# Patient Record
Sex: MALE | Race: WHITE
[De-identification: names, ages, dates, MRNs, and addresses within clinical notes are randomized per-mention and may not be internally consistent; named-entity substitution may affect disease eponyms.]

---

## 2018-02-12 ENCOUNTER — HOSPITAL ENCOUNTER (INPATIENT)
Dept: HOSPITAL 47 - EC | Age: 83
LOS: 3 days | Discharge: HOME HEALTH | DRG: 4 | End: 2018-02-15
Attending: OTOLARYNGOLOGY | Admitting: OTOLARYNGOLOGY
Payer: MEDICARE

## 2018-02-12 VITALS — BODY MASS INDEX: 24.6 KG/M2

## 2018-02-12 DIAGNOSIS — K57.30: ICD-10-CM
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
## 2018-02-12 DIAGNOSIS — R06.1: ICD-10-CM

## 2018-02-12 DIAGNOSIS — Z79.899: ICD-10-CM

## 2018-02-12 DIAGNOSIS — Z87.891: ICD-10-CM

## 2018-02-12 DIAGNOSIS — J39.2: ICD-10-CM

## 2018-02-12 DIAGNOSIS — J44.9: ICD-10-CM

## 2018-02-12 DIAGNOSIS — I10: ICD-10-CM

## 2018-02-12 DIAGNOSIS — J04.0: ICD-10-CM

## 2018-02-12 DIAGNOSIS — D64.9: ICD-10-CM

## 2018-02-12 DIAGNOSIS — N40.0: ICD-10-CM

## 2018-02-12 DIAGNOSIS — Z86.718: ICD-10-CM

## 2018-02-12 DIAGNOSIS — Z85.21: ICD-10-CM

## 2018-02-12 DIAGNOSIS — M19.91: ICD-10-CM

## 2018-02-12 DIAGNOSIS — Z92.21: ICD-10-CM

## 2018-02-12 DIAGNOSIS — F41.9: ICD-10-CM

## 2018-02-12 DIAGNOSIS — Z96.641: ICD-10-CM

## 2018-02-12 DIAGNOSIS — J98.8: Primary | ICD-10-CM

## 2018-02-12 DIAGNOSIS — R73.9: ICD-10-CM

## 2018-02-12 DIAGNOSIS — K21.9: ICD-10-CM

## 2018-02-12 DIAGNOSIS — J38.4: ICD-10-CM

## 2018-02-12 DIAGNOSIS — Z92.3: ICD-10-CM

## 2018-02-12 DIAGNOSIS — Z86.711: ICD-10-CM

## 2018-02-12 LAB
BASOPHILS # BLD AUTO: 0 K/UL (ref 0–0.2)
BASOPHILS NFR BLD AUTO: 0 %
EOSINOPHIL # BLD AUTO: 0.1 K/UL (ref 0–0.7)
EOSINOPHIL NFR BLD AUTO: 2 %
ERYTHROCYTE [DISTWIDTH] IN BLOOD BY AUTOMATED COUNT: 2.27 M/UL (ref 4.3–5.9)
ERYTHROCYTE [DISTWIDTH] IN BLOOD: 14.4 % (ref 11.5–15.5)
HCT VFR BLD AUTO: 21.4 % (ref 39–53)
HGB BLD-MCNC: 6.9 GM/DL (ref 13–17.5)
LYMPHOCYTES # SPEC AUTO: 1 K/UL (ref 1–4.8)
LYMPHOCYTES NFR SPEC AUTO: 14 %
MCH RBC QN AUTO: 30.3 PG (ref 25–35)
MCHC RBC AUTO-ENTMCNC: 32.2 G/DL (ref 31–37)
MCV RBC AUTO: 94 FL (ref 80–100)
MONOCYTES # BLD AUTO: 0.4 K/UL (ref 0–1)
MONOCYTES NFR BLD AUTO: 5 %
NEUTROPHILS # BLD AUTO: 5.6 K/UL (ref 1.3–7.7)
NEUTROPHILS NFR BLD AUTO: 78 %
PLATELET # BLD AUTO: 254 K/UL (ref 150–450)
WBC # BLD AUTO: 7.1 K/UL (ref 3.8–10.6)

## 2018-02-12 PROCEDURE — 84100 ASSAY OF PHOSPHORUS: CPT

## 2018-02-12 PROCEDURE — 86920 COMPATIBILITY TEST SPIN: CPT

## 2018-02-12 PROCEDURE — 83735 ASSAY OF MAGNESIUM: CPT

## 2018-02-12 PROCEDURE — 88312 SPECIAL STAINS GROUP 1: CPT

## 2018-02-12 PROCEDURE — 86850 RBC ANTIBODY SCREEN: CPT

## 2018-02-12 PROCEDURE — 86900 BLOOD TYPING SEROLOGIC ABO: CPT

## 2018-02-12 PROCEDURE — 0B110F4 BYPASS TRACHEA TO CUTANEOUS WITH TRACHEOSTOMY DEVICE, OPEN APPROACH: ICD-10-PCS

## 2018-02-12 PROCEDURE — 86901 BLOOD TYPING SEROLOGIC RH(D): CPT

## 2018-02-12 PROCEDURE — 80053 COMPREHEN METABOLIC PANEL: CPT

## 2018-02-12 PROCEDURE — 88305 TISSUE EXAM BY PATHOLOGIST: CPT

## 2018-02-12 PROCEDURE — 85025 COMPLETE CBC W/AUTO DIFF WBC: CPT

## 2018-02-12 PROCEDURE — 0CBS8ZX EXCISION OF LARYNX, VIA NATURAL OR ARTIFICIAL OPENING ENDOSCOPIC, DIAGNOSTIC: ICD-10-PCS

## 2018-02-12 RX ADMIN — METHYLPREDNISOLONE SODIUM SUCCINATE SCH MG: 125 INJECTION, POWDER, FOR SOLUTION INTRAMUSCULAR; INTRAVENOUS at 23:59

## 2018-02-12 NOTE — P.OP
Date of Procedure: 02/12/18


Preoperative Diagnosis: 


Impending airway compromise


Hypopharyngeal edema. radiation effects


Laryngitis/epiglottic edema


Postoperative Diagnosis: 


same


Procedure(s) Performed: 


Tracheostomy


Ligation of thyroid isthmus


Direct microscopic laryngoscopy with biopsy posterior laryngeal


Anesthesia: GETA, MAC


Surgeon: Frederic Lezama


Estimated Blood Loss (ml): 5


Pathology: other (Posterior laryngeal biopsy)


Condition: stable


Disposition: PACU


Indications for Procedure: 


This patient is status post radiotherapy for a laryngeal carcinoma.  He 

developed some stridor over the last 2 weeks which is scheduled to be 

critically worsened.  His stridor is such a problem that he is unable to sleep 

at night.  He started to become fatigued and his stridor is making short of 

breath.  He is also severely hoarse and is almost aphonic.  He had a course of 

steroids which I give short-term improvement.  After discussion wife have been 

I decided to proceed forward with a tracheotomy and laryngoscopy and biopsy.  

We will be ligating the isthmus at the time of surgery.  All risks, benefits, 

and alternative therapies were discussed.  Consent was obtained and all 

questions were answered.


Operative Findings: 


Patient had massive hypopharyngeal edema massive edema the epiglottis larynx 

posterior laryngeal space cricoid space etc.


Description of Procedure: 


This patient was taken to the operative room and placed in the supine position.

  IV sedation was administered the neck was marked and injected with lidocaine 1

% with epinephrine 1 100,000 approximately 10 minutes were allowed wait for 

full vasoconstrictive effects to take place.  After the neck was prepped a 

vertical incision was made from the cricoid to the suprasternal notch.  We 

dissected down to the strap muscles and the strap muscles were  in the 

midline exposing the cricoid cartilage and the thyroid isthmus.  We then freed 

the thyroid isthmus from the tracheal rings and with use of a LigaSure we 

 and ligated the thyroid isthmus to obtain access into the trachea.  

Once the anterior tracheal rings were exposed we made a inferiorly based flap 

between the second and third tracheal rings and inserted a #8 Shiley cuffed 

tracheotomy tube.  We closed the incision with a 4 rapid Vicryl.  2-0 silk was 

used for skin tie around the faceplate and a trach tie was placed around the 

neck.  The tracheotomy tube was converted to the ventilator and a general 

anesthetic was then administered the patient underwent a general anesthetic 

allowing us to access the mouth and throat for a laryngoscopy and biopsy.  A 

tooth guard was placed patient has very poor dental care and we entered the 

mouth with a Jako laryngoscope with care to avoid any trauma to the lips teeth 

gums and tongue.  Mouth was opened tongue was depressed and the entire Marquez and 

hypopharynx was evaluated including the base of tongue vallecula epiglottis 

postcricoid space piriform sinus Pierce vocal cords etc. etc.  This was 

utilized under microscope utilizing a high-powered Zeiss microscope with a 

variable focal length.  We found massive hypopharyngeal edema the epiglottis 

was extremely edematous the larynx was extremely edematous and the airway was 

very small and compromised.  We biopsied this previous site of cancer in the 

posterior laryngeal region and the patient tolerated this well.  Bleeding 

stopped spontaneously.  The rest of the examination was unremarkable other than 

for massive radiation edema.  All instrumentation was removed and the patient 

was taken to postanesthesia recovery in excellent condition.  Tolerated this 

well and the patient will be sent to the intensive care unit for close 

observation.  Dr. Jacob will be managing this patient medically.

## 2018-02-12 NOTE — P.GSHP
History of Present Illness


H&P Date: 02/12/18


Chief Complaint: Stridor impending airway compromise





This is an 85-year-old white male who I saw in the office earlier today.  He 

has developed some significant stridor both inspiratory and expiratory.  He's 

been unable to sleep for several days and he is starting to fatigue in regards 

to his ability to breathe.  He is almost aphonic.  He is status post 

radiotherapy for laryngeal carcinoma.





- Constitutional


Constitutional: Reports anorexia, Reports fatigue, Reports lethargy, Reports 

weakness, Reports weight loss





- EENT


Comment: 





.


Eyes: bilateral as per HPI


Ears: deny: decreased hearing, ear discharge


Ears, nose, mouth and throat: Reports hoarseness, Reports neck fullness/pressure

, Denies dysphagia, Denies epistaxis, Denies headache, Denies mouth pain





- Cardiovascular


Cardiovascular: Denies chest pain





- Respiratory


Respiratory: Reports dyspnea





- Gastrointestinal


Gastrointestinal: Reports loss of appetite





- Musculoskeletal


Musculoskeletal: Denies atrophy





- Integumentary


Integumentary: Denies acne, Denies boils





- Neurological


Neurological: Denies aphasia, Denies ataxia





- Endocrine


Endocrine: Denies cold intolerance





- Hematologic/Lymphatic


Hematologic/Lymphatic: Reports as per HPI





- Allergic/Immunologic


Allergic/Immunologic: Denies allergic rhinitis





Past Medical History


Past Medical History: Cancer, GERD/Reflux, Hypertension, Osteoarthritis (OA), 

Pulmonary Embolus (PE)


Additional Past Medical History / Comment(s): throat cancer, ANXIETY. RADIATION 

TX AT [facility] COMPLETED 12/14/17 AND CHEMOTHERAPY 7 TREATMENTS AT [facility] 

COMPLETED 12/17


History of Any Multi-Drug Resistant Organisms: None Reported


Past Surgical History: Orthopedic Surgery


Additional Past Surgical History / Comment(s): RIGHT HIP REPLACEMENT


Past Anesthesia/Blood Transfusion Reactions: No Reported Reaction


Additional Past Anesthesia/Blood Transfusion Reaction / Comment(s): HAD 2 UNITS 

DURING CHEMO AT [facility]


Past Psychological History: No Psychological Hx Reported


Smoking Status: Former smoker


Past Alcohol Use History: Occasional


Past Drug Use History: None Reported





- Past Family History


  ** Father


Family Medical History: Cancer


Additional Family Medical History / Comment(s): KIDNEY CANCER





  ** Mother


Family Medical History: Myocardial Infarction (MI)





Medications and Allergies


 Home Medications











 Medication  Instructions  Recorded  Confirmed  Type


 


Acetaminophen [Tylenol Extra 1,000 mg PO BID PRN 02/12/18 02/12/18 History





Strength]    


 


Atenolol [Tenormin] 50 mg PO DAILY 02/12/18 02/12/18 History


 


LORazepam [Ativan] 1 mg PO DAILY 02/12/18 02/12/18 History


 


Multivitamin/Iron/Folic Acid 1 tab PO DAILY 02/12/18 02/12/18 History





[Centrum Complete Multivit Tab]    


 


Naproxen Sodium [Aleve] 440 mg PO DAILY PRN 02/12/18 02/12/18 History


 


Omeprazole [PriLOSEC] 20 mg PO DAILY 02/12/18 02/12/18 History


 


Tamsulosin HCl [Flomax] 0.4 mg PO DAILY 02/12/18 02/12/18 History


 


amLODIPine BESYLATE/BENAZEPRIL 1 cap PO DAILY 02/12/18 02/12/18 History





[amLODIPine BESYLATE/BENAZEPRIL    





10-40 mg]    











 Allergies











Allergy/AdvReac Type Severity Reaction Status Date / Time


 


No Known Allergies Allergy   Verified 02/12/18 18:45














Surgical - Exam


Osteopathic Statement: *.  No significant issues noted on an osteopathic 

structural exam other than those noted in the History and Physical/Consult.


 Vital Signs











Temp Pulse Resp BP Pulse Ox


 


 97.7 F   109 H  20   110/60   100 


 


 02/12/18 15:58  02/12/18 15:58  02/12/18 15:58  02/12/18 15:58  02/12/18 15:58














- General





Head is normocephalic the face is symmetric there's no abnormal movements is a 

no nodules present.  Marquez's well-formed canals are clear her nose is patent.  

Mouth and throat no oral lesions are noted neck demonstrates significant 

laryngeal inflammation.  Patient has audible stridor both inspiratory and 

expiratory


well developed, well nourished, severe distress, cachectic, chronically ill





- Eyes


absent: ptosis, lesions, erythema, surgical pupil





- ENT


normal pinna, normal nares, normal mucosa, no congestion, decreased hearing, 

poor MCC





- Neck





Generalized radiation edema noted


no masses, no bruits





- Respiratory


other (Short of breath with expiratory and inspiratory stridor)





- Cardiovascular


Rhythm: regular





- Abdomen


Abdomen: soft





- Integumentary


no rash, no growths





- Neurologic


normal coordination, normal sensation





- Musculoskeletal


normal gait, normal posture





- Psychiatric


oriented to time, oriented to person, oriented to place, no speech is normal, 

memory intact





Assessment and Plan


(1) Acute airway obstruction


Current Visit: Yes   Status: Acute   Code(s): J98.8 - OTHER SPECIFIED 

RESPIRATORY DISORDERS   SNOMED Code(s): 84619569


   





(2) Stridor


Current Visit: Yes   Status: Acute   Code(s): R06.1 - STRIDOR   SNOMED Code(s): 

10847506


   





(3) Laryngeal edema


Current Visit: Yes   Status: Acute   Code(s): J38.4 - EDEMA OF LARYNX   SNOMED 

Code(s): 39212172


   


Plan: 





This patient presented to the office with severe stridor both inspiratory and 

expiratory with fatigue of breathing and shortness of breath.  The patient did 

not appear that he was able to continue this pattern of breathing for very much 

longer we decided to proceed forward with a tracheotomy along with a 

laryngoscopy biopsy and of course we will ligate the thyroid isthmus to obtain 

access to the anterior trachea.  All risks, benefits, and alternative therapies 

were discussed with the patient and the wife.  Consent was obtained and all 

questions were answered.

## 2018-02-13 LAB
ALBUMIN SERPL-MCNC: 3.6 G/DL (ref 3.5–5)
ALP SERPL-CCNC: 71 U/L (ref 38–126)
ALT SERPL-CCNC: 23 U/L (ref 21–72)
ANION GAP SERPL CALC-SCNC: 14 MMOL/L
AST SERPL-CCNC: 37 U/L (ref 17–59)
BASOPHILS # BLD AUTO: 0 K/UL (ref 0–0.2)
BASOPHILS NFR BLD AUTO: 0 %
BUN SERPL-SCNC: 20 MG/DL (ref 9–20)
CALCIUM SPEC-MCNC: 8.5 MG/DL (ref 8.4–10.2)
CHLORIDE SERPL-SCNC: 94 MMOL/L (ref 98–107)
CO2 SERPL-SCNC: 23 MMOL/L (ref 22–30)
EOSINOPHIL # BLD AUTO: 0 K/UL (ref 0–0.7)
EOSINOPHIL NFR BLD AUTO: 0 %
ERYTHROCYTE [DISTWIDTH] IN BLOOD BY AUTOMATED COUNT: 3.31 M/UL (ref 4.3–5.9)
ERYTHROCYTE [DISTWIDTH] IN BLOOD: 15.3 % (ref 11.5–15.5)
GLUCOSE BLD-MCNC: 160 MG/DL (ref 75–99)
GLUCOSE BLD-MCNC: 218 MG/DL (ref 75–99)
GLUCOSE BLD-MCNC: 84 MG/DL (ref 75–99)
GLUCOSE SERPL-MCNC: 182 MG/DL (ref 74–99)
HCT VFR BLD AUTO: 30.6 % (ref 39–53)
HGB BLD-MCNC: 10.1 GM/DL (ref 13–17.5)
LYMPHOCYTES # SPEC AUTO: 1.1 K/UL (ref 1–4.8)
LYMPHOCYTES NFR SPEC AUTO: 12 %
MAGNESIUM SPEC-SCNC: 0.6 MG/DL (ref 1.6–2.3)
MCH RBC QN AUTO: 30.4 PG (ref 25–35)
MCHC RBC AUTO-ENTMCNC: 32.9 G/DL (ref 31–37)
MCV RBC AUTO: 92.4 FL (ref 80–100)
MONOCYTES # BLD AUTO: 0.2 K/UL (ref 0–1)
MONOCYTES NFR BLD AUTO: 2 %
NEUTROPHILS # BLD AUTO: 7.9 K/UL (ref 1.3–7.7)
NEUTROPHILS NFR BLD AUTO: 85 %
PLATELET # BLD AUTO: 268 K/UL (ref 150–450)
POTASSIUM SERPL-SCNC: 4.3 MMOL/L (ref 3.5–5.1)
PROT SERPL-MCNC: 6.6 G/DL (ref 6.3–8.2)
SODIUM SERPL-SCNC: 131 MMOL/L (ref 137–145)
WBC # BLD AUTO: 9.3 K/UL (ref 3.8–10.6)

## 2018-02-13 PROCEDURE — 30233N1 TRANSFUSION OF NONAUTOLOGOUS RED BLOOD CELLS INTO PERIPHERAL VEIN, PERCUTANEOUS APPROACH: ICD-10-PCS

## 2018-02-13 RX ADMIN — PANTOPRAZOLE SODIUM SCH MG: 40 TABLET, DELAYED RELEASE ORAL at 18:28

## 2018-02-13 RX ADMIN — PANTOPRAZOLE SODIUM SCH MG: 40 TABLET, DELAYED RELEASE ORAL at 08:23

## 2018-02-13 RX ADMIN — HEPARIN SODIUM SCH UNIT: 5000 INJECTION, SOLUTION INTRAVENOUS; SUBCUTANEOUS at 08:23

## 2018-02-13 RX ADMIN — CEFTRIAXONE SODIUM SCH MG: 1 INJECTION, POWDER, FOR SOLUTION INTRAMUSCULAR; INTRAVENOUS at 08:31

## 2018-02-13 RX ADMIN — HYDROCODONE BITARTRATE AND ACETAMINOPHEN PRN EACH: 5; 325 TABLET ORAL at 11:36

## 2018-02-13 RX ADMIN — HEPARIN SODIUM SCH UNIT: 5000 INJECTION, SOLUTION INTRAVENOUS; SUBCUTANEOUS at 15:13

## 2018-02-13 RX ADMIN — ATENOLOL SCH MG: 50 TABLET ORAL at 08:24

## 2018-02-13 RX ADMIN — MAGNESIUM SULFATE IN DEXTROSE SCH MLS/HR: 10 INJECTION, SOLUTION INTRAVENOUS at 11:05

## 2018-02-13 RX ADMIN — TAMSULOSIN HYDROCHLORIDE SCH MG: 0.4 CAPSULE ORAL at 18:28

## 2018-02-13 RX ADMIN — LISINOPRIL SCH MG: 10 TABLET ORAL at 08:24

## 2018-02-13 RX ADMIN — MAGNESIUM SULFATE IN DEXTROSE SCH MLS/HR: 10 INJECTION, SOLUTION INTRAVENOUS at 08:37

## 2018-02-13 RX ADMIN — MAGNESIUM SULFATE IN DEXTROSE SCH MLS/HR: 10 INJECTION, SOLUTION INTRAVENOUS at 12:28

## 2018-02-13 RX ADMIN — INSULIN ASPART SCH UNIT: 100 INJECTION, SOLUTION INTRAVENOUS; SUBCUTANEOUS at 12:25

## 2018-02-13 RX ADMIN — CEFAZOLIN SCH MLS/HR: 330 INJECTION, POWDER, FOR SOLUTION INTRAMUSCULAR; INTRAVENOUS at 11:05

## 2018-02-13 RX ADMIN — INSULIN ASPART SCH UNIT: 100 INJECTION, SOLUTION INTRAVENOUS; SUBCUTANEOUS at 21:46

## 2018-02-13 RX ADMIN — HYDROCODONE BITARTRATE AND ACETAMINOPHEN PRN EACH: 5; 325 TABLET ORAL at 18:54

## 2018-02-13 RX ADMIN — INSULIN ASPART SCH: 100 INJECTION, SOLUTION INTRAVENOUS; SUBCUTANEOUS at 17:43

## 2018-02-13 RX ADMIN — MAGNESIUM SULFATE IN DEXTROSE SCH MLS/HR: 10 INJECTION, SOLUTION INTRAVENOUS at 09:40

## 2018-02-13 RX ADMIN — ACETAMINOPHEN PRN MG: 325 TABLET, FILM COATED ORAL at 14:54

## 2018-02-13 RX ADMIN — METHYLPREDNISOLONE SODIUM SUCCINATE SCH MG: 125 INJECTION, POWDER, FOR SOLUTION INTRAMUSCULAR; INTRAVENOUS at 21:45

## 2018-02-13 NOTE — CONS
CONSULTATION



ATTENDING PHYSICIAN:

Dr. Lezama.



CONSULTING PHYSICIAN:

Dr. CHRISSY Jacob.



CONSULTATION REGARDING:

Medical management.



HISTORY OF PRESENT ILLNESS:

This 85-year-old gentleman was admitted to the hospital because of inspiratory stridor

and significant shortness of breath.  The patient recently had been treated for

laryngeal carcinoma and with radiation.  The patient has had good results from that

perspective; however, the patient has significant laryngeal edema and has developed

stridor and difficulty breathing.  The patient was treated by Dr. Lezama as an

outpatient with steroids with improvement; however, on his weekly visits, he has

noticed that the patient kept on relapsing to the point that he has difficulty

breathing and thus he brings the patient in for urgent tracheostomy to prevent him from

getting into severe respiratory distress.  The patient, as mentioned above, had

recently undergone radiation therapy.



PAST MEDICAL HISTORY:

Significant for hypertension, degenerative arthritis, BPH, gastroesophageal reflux

disease without esophagitis, hyperlipidemia and history of gout.  He also has had a

previous history of DVT and possible pulmonary embolism over 20 years ago. patient had

PE and DVT in , history of colonic diverticulosis.



SOCIAL HISTORY:

Patient is , lives with his spouse.  Able to take care of himself.



PAST SURGICAL HISTORY:

Significant for tonsillectomy and adenoidectomy, right total hip arthroplasty.



FAMILY MEDICAL HISTORY:

Father  at the age of 73, coronary artery disease.  Mother  at age of 72,

sudden death.  The patient had a sister who  at the age of 51.  She had cirrhosis

of the liver.  The patient has a daughter, 57, in good health; a daughter, 60, in good

health.



VACCINATION HISTORY:

Pneumovax previously vaccinated and previously vaccinated for zoster vaccine.



MEDICATIONS:

Medications have included:

1. Aleve p.r.n.

2. Vitamins daily.

3. Ativan 1 mg daily at bedtime.

4. Lotrel 10-40 one daily.

5. Flomax 0.4 mg daily.

6. Prilosec 20 mg daily.

7. Atenolol 50 mg daily.



ALLERGIES:

None known.



PHYSICAL EXAMINATION:

Patient is evaluated this morning.  The patient has had a tracheostomy as mentioned

about last evening.  Vital signs reveal temperature 97.9, pulse 74, respirations 14,

blood pressure 119/64, pulse ox 100% on 35% FiO2 through a trach collar.

HEENT:  Normocephalic.

NECK:  Status post fresh tracheostomy.  No lymphadenopathy.  Oral cavity is dry.

CHEST:  Some generalized mild decreased air flow.

CARDIAC:  Distant heart sounds, S1, S2 with no gallops.  Regular rhythm.  Systolic

murmur 2/6 left sternal border.

ABDOMEN:  Soft.  Bowel sounds present.  Extremities reveal trace edema, right ankle.

Otherwise, moves both upper lower extremities adequately.

NEUROLOGIC:  Awake, alert, oriented, well-coordinated movements.  Able to not speak,

but makes gestures for adequate communication.



LABORATORY ASSESSMENT:

CBC reported last night of 6.9 hemoglobin.  The patient was transfused 1 unit of packed

red cells.  Subsequent hemoglobin this morning is 10.1, which is probably inaccurate,

the patient has jumped 3 g from 1 unit of packed red cells.  Sodium is 131, potassium

is normal.  BUN, creatinine are normal.  Patient's glucose was 182.  Magnesium was low

at 0.6 for which he has received infusion.  Blood sugars are covered with insulin.



ASSESSMENT:

1. Hypertension on medical therapy.

2. Anemia with no evidence of acute bleeding.

3. Status post radiation therapy for carcinoma of the larynx.

4. 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

5. Chronic obstructive pulmonary disease.

6. Previous history of deep venous thrombosis.

7. Hyperglycemia.



PLAN:

The patient at present is stable.  Continue present medical regimen.  Patient's

condition discussed with the spouse yesterday and Dr. Lezama yesterday evening.

Will continue present trach care and education.  Continue patient's medications.

Prognosis remains guarded.





MMODL / IJN: [id number] / Job#: [id number]

## 2018-02-13 NOTE — P.PN
Subjective


Progress Note Date: 02/13/18


Principal diagnosis: 





Status post tracheotomy doing well





Patient is status post a tracheotomy and is doing well he's breathing 

wonderfully through his trach tube.  He has no further stridor or respiratory 

distress.  We're working with respiratory therapy for trach education and we 

need to arrange a home medical supplies service for trach supplies and training.





Objective





- Vital Signs


Vital signs: 


 Vital Signs











Temp  98.1 F   02/13/18 14:51


 


Pulse  60   02/13/18 14:51


 


Resp  20   02/13/18 14:51


 


BP  100/60   02/13/18 14:51


 


Pulse Ox  98   02/13/18 14:51








 Intake & Output











 02/12/18 02/13/18 02/13/18





 18:59 06:59 18:59


 


Intake Total  1550 1200


 


Output Total  504 450


 


Balance  1046 750


 


Weight 73.482 kg 77.9 kg 


 


Intake:   


 


  IV  1240 980


 


    D5-0.45% NaCl with KCl  480 400





    20Meq/l 1,000 ml @ 80 mls   





    /hr IV .[id number] [facility] Rx[facility]   





    [id number]   


 


    Magnesium Sulfate-D5w Pmx   400





    1 gm In Dextrose/Water 1   





    100ml.bag @ 100 mls/hr   





    IVPB Q1H [facility] Rx#:   





    [id number]   


 


    PRBC  310 


 


    Sodium Chloride 0.9% 1,   180





    000 ml @ 60 mls/hr IV .   





    [id number] [facility] Rx#:[id number]   


 


  Intake, IV Titration   220





  Amount   


 


    Lactated Ringers 1,000 ml   220





    As IV .STK-MED ONE Rx#:   





    [id number]   


 


  Blood Product  310 


 


    Rc Pheresis 2 As3  Unit  310 





    [id number]   


 


Output:   


 


  Urine  500 450


 


  Estimated Blood Loss  4 


 


Other:   


 


  Voiding Method   Bedside Commode


 


  # Voids 2  














- Constitutional


General appearance: Present: average body habitus





- EENT


Eyes: Present: PERRLA





- Neck


Details: 





Trach tube in place functioning well


Neck: Absent: lymphadenopathy





- Labs


CBC & Chem 7: 


 02/13/18 06:46





 02/13/18 06:46


Labs: 


 Abnormal Lab Results - Last 24 Hours (Table)











  02/12/18 02/12/18 02/13/18 Range/Units





  22:40 23:48 06:46 


 


RBC  2.27 L   3.31 L  (4.30-5.90)  m/uL


 


Hgb  6.9 L*   10.1 L D  (13.0-17.5)  gm/dL


 


Hct  21.4 L   30.6 L  (39.0-53.0)  %


 


Neutrophils #    7.9 H  (1.3-7.7)  k/uL


 


Sodium     (137-145)  mmol/L


 


Chloride     ()  mmol/L


 


Glucose     (74-99)  mg/dL


 


POC Glucose (mg/dL)     (75-99)  mg/dL


 


Magnesium     (1.6-2.3)  mg/dL


 


Crossmatch   See Detail   














  02/13/18 02/13/18 Range/Units





  06:46 12:17 


 


RBC    (4.30-5.90)  m/uL


 


Hgb    (13.0-17.5)  gm/dL


 


Hct    (39.0-53.0)  %


 


Neutrophils #    (1.3-7.7)  k/uL


 


Sodium  131 L   (137-145)  mmol/L


 


Chloride  94 L   ()  mmol/L


 


Glucose  182 H   (74-99)  mg/dL


 


POC Glucose (mg/dL)   218 H  (75-99)  mg/dL


 


Magnesium  0.6 L*   (1.6-2.3)  mg/dL


 


Crossmatch    














Assessment and Plan


(1) Acute airway obstruction


Current Visit: Yes   Status: Acute   Code(s): J98.8 - OTHER SPECIFIED 

RESPIRATORY DISORDERS   SNOMED Code(s): 00980291


   





(2) Stridor


Current Visit: Yes   Status: Acute   Code(s): R06.1 - STRIDOR   SNOMED Code(s): 

51227032


   





(3) Laryngeal edema


Current Visit: Yes   Status: Acute   Code(s): J38.4 - EDEMA OF LARYNX   SNOMED 

Code(s): 53400360


   


Plan: 





Patient is doing well after his surgery.  We will be working with respiratory 

therapy for trach education and  for discharge planning.  The 

patient will need a home healthcare company to help interface with his needs.  

Trach training for the wife is also recommended.  A home health care nurses 

also recommended.


Time with Patient: Greater than 30

## 2018-02-13 NOTE — P.CNPUL
History of Present Illness


Consult date: 02/13/18


Reason for consult: dyspnea, other


Chief complaint: Increasing respiratory distress with stridor


History of present illness: 





Consult dated 02/13/2018





85-year-old male who was seen in the office of the ear nose and throat doctor.  

The patient apparently developed some respiratory distress with stridor.  The 

patient has been unable to sleep for a couple days because of the breathing 

difficulty.  He is on was not able to even speak.  He apparently was status 

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  The patient had 

a tracheostomy performed yesterday.  He is postop day #1.  Transferred back 

here to the ICU on a trach collar.  Seemed pretty stable.  He did receive 1 

unit of packed red blood cells.  Currently he's got an IV of dextrose half-

normal saline with 20 of potassium chloride at 80 mL an hour.  The trach collar 

is 35%.  He was admitted on February 12th,  which is yesterday.  This morning, 

the patient's rather sleepy.  Doesn't appear to have any distress or issues.  

No complaints.  Minimal pain in the neck area.  His past medical history is 

positive for the laryngeal/vocal cord cancer, acid reflux disease, hypertension

, osteoarthritis, pulmonary embolism, anxiety, and he is status post both 

radiation and chemotherapy for his cancer.  He's also had right hip 

replacement.  Medications are reviewed.





Review of Systems





Review of systems





Constitutional negative neurologic negative HEENT status post tracheostomy for 

stridor





Cardiovascular negative pulmonary shortness of breath, improved





GI negative  negative rheumatologic negative immunologic negative 

endocrinologic negative dermatologic negative





Past Medical History


Past Medical History: Cancer, GERD/Reflux, Hypertension, Osteoarthritis (OA), 

Pulmonary Embolus (PE)


Additional Past Medical History / Comment(s): throat cancer, ANXIETY. RADIATION 

TX AT [facility] COMPLETED 12/14/17 AND CHEMOTHERAPY 7 TREATMENTS AT [facility] 

COMPLETED 12/17


History of Any Multi-Drug Resistant Organisms: None Reported


Past Surgical History: Orthopedic Surgery


Additional Past Surgical History / Comment(s): RIGHT HIP REPLACEMENT


Past Anesthesia/Blood Transfusion Reactions: No Reported Reaction


Additional Past Anesthesia/Blood Transfusion Reaction / Comment(s): HAD 2 UNITS 

DURING CHEMO AT [facility]


Past Psychological History: No Psychological Hx Reported


Smoking Status: Former smoker


Past Alcohol Use History: Occasional


Past Drug Use History: None Reported





- Past Family History


  ** Father


Family Medical History: Cancer


Additional Family Medical History / Comment(s): KIDNEY CANCER





  ** Mother


Family Medical History: Myocardial Infarction (MI)





Medications and Allergies


 Home Medications











 Medication  Instructions  Recorded  Confirmed  Type


 


Acetaminophen [Tylenol Extra 1,000 mg PO BID PRN 02/12/18 02/12/18 History





Strength]    


 


Atenolol [Tenormin] 50 mg PO DAILY 02/12/18 02/12/18 History


 


LORazepam [Ativan] 1 mg PO DAILY 02/12/18 02/12/18 History


 


Multivitamin/Iron/Folic Acid 1 tab PO DAILY 02/12/18 02/12/18 History





[Centrum Complete Multivit Tab]    


 


Naproxen Sodium [Aleve] 440 mg PO DAILY PRN 02/12/18 02/12/18 History


 


Omeprazole [PriLOSEC] 20 mg PO DAILY 02/12/18 02/12/18 History


 


Tamsulosin HCl [Flomax] 0.4 mg PO DAILY 02/12/18 02/12/18 History


 


amLODIPine BESYLATE/BENAZEPRIL 1 cap PO DAILY 02/12/18 02/12/18 History





[amLODIPine BESYLATE/BENAZEPRIL    





10-40 mg]    











 Allergies











Allergy/AdvReac Type Severity Reaction Status Date / Time


 


No Known Allergies Allergy   Verified 02/12/18 18:45














Physical Exam


Osteopathic Statement: *.  No significant issues noted on an osteopathic 

structural exam other than those noted in the History and Physical/Consult.


Vitals: 


 Vital Signs











  Temp Pulse Pulse Pulse Resp BP BP


 


 02/13/18 07:00   67    11 L  137/64 


 


 02/13/18 06:30   100    35 H  126/70 


 


 02/13/18 06:00   78    8 L  117/59 


 


 02/13/18 05:30  97.7 F  79    10 L  121/64 


 


 02/13/18 05:00   78    13  131/66 


 


 02/13/18 04:30   86    8 L  129/71 


 


 02/13/18 04:00  97.9 F  104 H  84   19  113/65 


 


 02/13/18 03:44   99    12  112/64 


 


 02/13/18 03:30   92    12  113/64 


 


 02/13/18 03:14  97.9 F  76    12  113/64 


 


 02/13/18 03:04  97.5 F L  86    12  115/63 


 


 02/13/18 03:00   81    11 L  115/63 


 


 02/13/18 02:30   100    20  140/68 


 


 02/13/18 02:00   70    9 L  


 


 02/13/18 01:30   75    6 L  


 


 02/13/18 01:00   84    8 L  


 


 02/13/18 00:30   85    19  127/66 


 


 02/13/18 00:00   79    9 L  127/66 


 


 02/12/18 23:30   94    19  127/66 


 


 02/12/18 23:00   92    44 H  


 


 02/12/18 22:30    84   16  


 


 02/12/18 22:29       


 


 02/12/18 17:59  97.5 F L    108 H  16   167/85


 


 02/12/18 15:58  97.7 F  109 H    20  110/60 














  Pulse Ox


 


 02/13/18 07:00  98


 


 02/13/18 06:30  97


 


 02/13/18 06:00  99


 


 02/13/18 05:30  98


 


 02/13/18 05:00  100


 


 02/13/18 04:30  100


 


 02/13/18 04:00  93 L


 


 02/13/18 03:44 


 


 02/13/18 03:30  98


 


 02/13/18 03:14 


 


 02/13/18 03:04  99


 


 02/13/18 03:00  98


 


 02/13/18 02:30  97


 


 02/13/18 02:00  100


 


 02/13/18 01:30  99


 


 02/13/18 01:00  98


 


 02/13/18 00:30  100


 


 02/13/18 00:00  100


 


 02/12/18 23:30  100


 


 02/12/18 23:00  98


 


 02/12/18 22:30  100


 


 02/12/18 22:29  100


 


 02/12/18 17:59  98


 


 02/12/18 15:58  100








 Intake and Output











 02/12/18 02/13/18 02/13/18





 22:59 06:59 14:59


 


Intake Total 450 1100 80


 


Output Total 4 500 0


 


Balance 446 600 80


 


Intake:   


 


   790 80


 


    D5-0.45% NaCl with KCl  480 80





    20Meq/l 1,000 ml @ 80 mls   





    /hr IV .[id number] [facility] Rx#:   





    [id number]   


 


    PRBC  310 


 


  Blood Product  310 


 


    Rc Pheresis 2 As3  Unit  310 





    [id number]   


 


Output:   


 


  Urine  500 0


 


  Estimated Blood Loss 4  


 


Other:   


 


  # Voids 2  


 


  Weight 73.482 kg 77.9 kg 














No acute distress, oriented 3.





HEENT examination is grossly unremarkable.  Mucous membranes are moist.  No 

oral lesions.





Neck supple.  Full range of motion.  No adenopathy thyromegaly or neck vein 

distention.  Fresh midline tracheostomy is noted.  Trach collar is noted.





Cardiovascular examination reveals regular rhythm rate.  S1-S2 normal.  No S3 

or S4.  No discernible murmur noted.





Lungs reveal clear breath sounds.  Her sounds are equal bilaterally.  No 

adventitious lung sounds including wheezes rhonchi or crackles.





Abdomen soft bowel sounds are heard.  No masses or tenderness.





Extremities are intact.  No cyanosis clubbing or edema.





Skin is without rash or lesion.





Neurologic examination is brief but nonfocal.





Results





- Laboratory Findings


CBC and BMP: 


 02/13/18 06:46





 02/13/18 06:46


Abnormal lab findings: 


 Abnormal Labs











  02/12/18 02/12/18 02/13/18





  22:40 23:48 06:46


 


RBC  2.27 L   3.31 L


 


Hgb  6.9 L*   10.1 L D


 


Hct  21.4 L   30.6 L


 


Neutrophils #    7.9 H


 


Sodium   


 


Chloride   


 


Glucose   


 


Magnesium   


 


Crossmatch   See Detail 














  02/13/18





  06:46


 


RBC 


 


Hgb 


 


Hct 


 


Neutrophils # 


 


Sodium  131 L


 


Chloride  94 L


 


Glucose  182 H


 


Magnesium  0.6 L*


 


Crossmatch 














- Diagnostic Findings


Chest x-ray: image reviewed (Labs x-rays a medications are all reviewed.)





Assessment and Plan


Assessment: 





Assessment





History of laryngeal/vocal cord cancer, status post chemotherapy and radiation 

therapy, with developing shortness of breath and stridor, postop day #1 status 

post tracheostomy





History of gastroesophageal reflux disease





History of hypertension





History of osteoarthritis





History of pulmonary most him





History of anxiety








Plan: 





Plan dated 02/13/2018





The patient seems be doing relatively well.  The patient could probably be 

transferred out to the general medical floor later today.  We'll try and get 

him over to oncology.  No additional recommendations are made.  Labs x-rays a 

medications are all reviewed.


Time with Patient: Greater than 30

## 2018-02-14 LAB
BASOPHILS # BLD AUTO: 0 K/UL (ref 0–0.2)
BASOPHILS NFR BLD AUTO: 0 %
EOSINOPHIL # BLD AUTO: 0 K/UL (ref 0–0.7)
EOSINOPHIL NFR BLD AUTO: 0 %
ERYTHROCYTE [DISTWIDTH] IN BLOOD BY AUTOMATED COUNT: 2.77 M/UL (ref 4.3–5.9)
ERYTHROCYTE [DISTWIDTH] IN BLOOD: 15.5 % (ref 11.5–15.5)
GLUCOSE BLD-MCNC: 116 MG/DL (ref 75–99)
GLUCOSE BLD-MCNC: 118 MG/DL (ref 75–99)
GLUCOSE BLD-MCNC: 119 MG/DL (ref 75–99)
GLUCOSE BLD-MCNC: 132 MG/DL (ref 75–99)
HCT VFR BLD AUTO: 25.9 % (ref 39–53)
HGB BLD-MCNC: 8.3 GM/DL (ref 13–17.5)
LYMPHOCYTES # SPEC AUTO: 1 K/UL (ref 1–4.8)
LYMPHOCYTES NFR SPEC AUTO: 10 %
MCH RBC QN AUTO: 29.9 PG (ref 25–35)
MCHC RBC AUTO-ENTMCNC: 31.9 G/DL (ref 31–37)
MCV RBC AUTO: 93.7 FL (ref 80–100)
MONOCYTES # BLD AUTO: 0.4 K/UL (ref 0–1)
MONOCYTES NFR BLD AUTO: 4 %
NEUTROPHILS # BLD AUTO: 8.2 K/UL (ref 1.3–7.7)
NEUTROPHILS NFR BLD AUTO: 85 %
PLATELET # BLD AUTO: 268 K/UL (ref 150–450)
WBC # BLD AUTO: 9.6 K/UL (ref 3.8–10.6)

## 2018-02-14 RX ADMIN — INSULIN ASPART SCH: 100 INJECTION, SOLUTION INTRAVENOUS; SUBCUTANEOUS at 17:41

## 2018-02-14 RX ADMIN — HYDROCODONE BITARTRATE AND ACETAMINOPHEN PRN EACH: 5; 325 TABLET ORAL at 01:22

## 2018-02-14 RX ADMIN — PANTOPRAZOLE SODIUM SCH MG: 40 TABLET, DELAYED RELEASE ORAL at 18:03

## 2018-02-14 RX ADMIN — HEPARIN SODIUM SCH UNIT: 5000 INJECTION, SOLUTION INTRAVENOUS; SUBCUTANEOUS at 23:08

## 2018-02-14 RX ADMIN — ACETAMINOPHEN PRN MG: 325 TABLET, FILM COATED ORAL at 16:32

## 2018-02-14 RX ADMIN — CEFAZOLIN SCH MLS/HR: 330 INJECTION, POWDER, FOR SOLUTION INTRAMUSCULAR; INTRAVENOUS at 01:23

## 2018-02-14 RX ADMIN — METHYLPREDNISOLONE SODIUM SUCCINATE SCH MG: 125 INJECTION, POWDER, FOR SOLUTION INTRAMUSCULAR; INTRAVENOUS at 23:07

## 2018-02-14 RX ADMIN — HEPARIN SODIUM SCH UNIT: 5000 INJECTION, SOLUTION INTRAVENOUS; SUBCUTANEOUS at 00:57

## 2018-02-14 RX ADMIN — CEFAZOLIN SCH MLS/HR: 330 INJECTION, POWDER, FOR SOLUTION INTRAMUSCULAR; INTRAVENOUS at 21:48

## 2018-02-14 RX ADMIN — TAMSULOSIN HYDROCHLORIDE SCH MG: 0.4 CAPSULE ORAL at 18:03

## 2018-02-14 RX ADMIN — PANTOPRAZOLE SODIUM SCH MG: 40 TABLET, DELAYED RELEASE ORAL at 13:15

## 2018-02-14 RX ADMIN — LISINOPRIL SCH MG: 10 TABLET ORAL at 09:00

## 2018-02-14 RX ADMIN — INSULIN ASPART SCH: 100 INJECTION, SOLUTION INTRAVENOUS; SUBCUTANEOUS at 21:45

## 2018-02-14 RX ADMIN — CEFTRIAXONE SODIUM SCH MG: 1 INJECTION, POWDER, FOR SOLUTION INTRAMUSCULAR; INTRAVENOUS at 10:59

## 2018-02-14 RX ADMIN — HEPARIN SODIUM SCH UNIT: 5000 INJECTION, SOLUTION INTRAVENOUS; SUBCUTANEOUS at 09:01

## 2018-02-14 RX ADMIN — ATENOLOL SCH MG: 50 TABLET ORAL at 09:00

## 2018-02-14 RX ADMIN — INSULIN ASPART SCH UNIT: 100 INJECTION, SOLUTION INTRAVENOUS; SUBCUTANEOUS at 08:02

## 2018-02-14 RX ADMIN — HYDROCODONE BITARTRATE AND ACETAMINOPHEN PRN EACH: 5; 325 TABLET ORAL at 09:48

## 2018-02-14 RX ADMIN — INSULIN ASPART SCH: 100 INJECTION, SOLUTION INTRAVENOUS; SUBCUTANEOUS at 13:15

## 2018-02-14 RX ADMIN — HEPARIN SODIUM SCH UNIT: 5000 INJECTION, SOLUTION INTRAVENOUS; SUBCUTANEOUS at 18:03

## 2018-02-14 NOTE — PN
PROGRESS NOTE



ATTENDING PHYSICIAN:

Dr. Lezama.



CONSULTING PHYSICIAN:

Dr. CHRISSY Jacob.



CHIEF COMPLAINT:

Re-evaluation.



HISTORY OF PRESENT ILLNESS:

This is an 85-year-old gentleman who was admitted to the hospital and undergone a

tracheostomy for significant laryngeal edema.  The patient has previous radiation for

CA of the larynx.  The patient is actually feeling fairly well.  Did not sleep well

last night.  He normally takes Ativan at night for sleep.  The patient otherwise denies

any other symptoms.  He is only able to communicate limited.



REVIEW OF SYSTEMS:

Neuro:  Denies any headaches, dizziness.  Psych no anxiety.  Cardiac:  Denies chest

pain.  Respiratory denies shortness breath.  Some cough.  GI no nausea, vomiting.

Appetite okay.   no symptoms.  Extremities denies pain, edema.

CONSTITUTIONAL:  No fever or chills reported.



PHYSICAL EXAMINATION:

Pleasant gentleman in no distress.  Vital signs reveals temperature 97.2, pulse 55,

respirations 18, blood pressure 108/58, pulse ox 97%.  HEENT:  Normocephalic.  Neck

patient has a tracheostomy, fresh tracheostomy.  Chest is mild generalized decreased

air flow.  Occasional rhonchi, which clear with cough.  Cardiac distant.  HEART:

Sounds S1, S2 with no gallop.  Systolic murmur 2/6 left sternal border.

ABDOMEN:  Soft.  Bowel sounds present.  Extremities are no edema.  No tenderness.

Neurological awake, alert, appears oriented with well-coordinated movements both upper

lower extremities.



LABORATORY ASSESSMENT:

CBC which revealed a hemoglobin of 8.3, which is probably more accurate than

yesterday's hemoglobin.  Blood sugar is adequately controlled.



ASSESSMENT:

1. Carcinoma of the larynx.

2. Status post tracheostomy for laryngeal edema.

3. Anemia.

4. Hypertension.

5. Remote history of deep vein thrombosis and pulmonary embolism.



PLAN:

The patient at present is stable.  Continue present medical regimen.  Patient's

condition discussed with the patient.  Prognosis is guarded.  We will resume the

patient's Ativan.  The patient also will have a repeat CBC in the morning.  Prognosis

guarded.





MMODL / IJN: [id number] / Job#: [id number]

## 2018-02-14 NOTE — P.PN
Subjective


Progress Note Date: 02/14/18


Principal diagnosis: 





Status post tracheotomy for impending airway compromise





Patient has done well after tracheotomy.  He is undergoing trach education.  We 

are arranging for home healthcare and tracheostomy supplies.  He is having very 

little drainage from his trach site.





Objective





- Vital Signs


Vital signs: 


 Vital Signs











Temp  97.1 F L  02/14/18 15:00


 


Pulse  64   02/14/18 15:00


 


Resp  18   02/14/18 15:00


 


BP  105/61   02/14/18 15:00


 


Pulse Ox  97   02/14/18 15:00








 Intake & Output











 02/13/18 02/14/18 02/14/18





 18:59 06:59 18:59


 


Intake Total 1740 480 840


 


Output Total 450  


 


Balance 1290 480 840


 


Intake:   


 


  IV 1280 480 400


 


    D5-0.45% NaCl with KCl 400  





    20Meq/l 1,000 ml @ 80 mls   





    /hr IV .[id number] [facility] Rx#:   





    [id number]   


 


    Magnesium Sulfate-D5w Pmx 400  





    1 gm In Dextrose/Water 1   





    100ml.bag @ 100 mls/hr   





    IVPB Q1H [facility] Rx#:   





    [id number]   


 


    Sodium Chloride 0.9% 1, 480 480 400





    000 ml @ 60 mls/hr IV .   





    [id number] [facility] Rx#:[id number]   


 


  Intake, IV Titration 460  





  Amount   


 


    Lactated Ringers 1,000 ml 460  





    As IV .STK-MED ONE Rx#:   





    [id number]   


 


  Oral   440


 


Output:   


 


  Urine 450  


 


Other:   


 


  Voiding Method Bedside Commode  


 


  # Voids 1 1 














- Constitutional


General appearance: Present: average body habitus





- EENT


Eyes: Present: PERRLA


ENT: Present: normal oropharynx





- Neck


Details: 





Tracheotomy tube in place doing well.  The faceplate has sutures in place.


Neck: Present: normal ROM.  Absent: lymphadenopathy





- Psychiatric


Psychiatric: Present: A&O x's 3, appropriate affect





- Labs


CBC & Chem 7: 


 02/14/18 08:47





 02/13/18 06:46


Labs: 


 Abnormal Lab Results - Last 24 Hours (Table)











  02/13/18 02/14/18 02/14/18 Range/Units





  20:46 07:41 08:47 


 


RBC    2.77 L  (4.30-5.90)  m/uL


 


Hgb    8.3 L D  (13.0-17.5)  gm/dL


 


Hct    25.9 L  (39.0-53.0)  %


 


Neutrophils #    8.2 H  (1.3-7.7)  k/uL


 


POC Glucose (mg/dL)  160 H  132 H   (75-99)  mg/dL














  02/14/18 02/14/18 Range/Units





  12:23 17:05 


 


RBC    (4.30-5.90)  m/uL


 


Hgb    (13.0-17.5)  gm/dL


 


Hct    (39.0-53.0)  %


 


Neutrophils #    (1.3-7.7)  k/uL


 


POC Glucose (mg/dL)  118 H  119 H  (75-99)  mg/dL














Assessment and Plan


(1) Acute airway obstruction


Current Visit: Yes   Status: Acute   Code(s): J98.8 - OTHER SPECIFIED 

RESPIRATORY DISORDERS   SNOMED Code(s): 50543812


   





(2) Stridor


Current Visit: Yes   Status: Acute   Code(s): R06.1 - STRIDOR   SNOMED Code(s): 

17432250


   





(3) Laryngeal edema


Current Visit: Yes   Status: Acute   Code(s): J38.4 - EDEMA OF LARYNX   SNOMED 

Code(s): 84690372


   


Plan: 





Once this patient and his wife are well trained in tracheotomy care and we've 

arranged for a home health care agency and a home health care nurse, this 

patient will be discharged.  I anticipate he should be discharged by tomorrow 

if all criteria is met.

## 2018-02-14 NOTE — P.PN
Subjective


Progress Note Date: 02/14/18


Principal diagnosis: 


History of laryngeal/vocal cord cancer, status post chemotherapy and radiation 

therapy, status post tracheostomy, postop day 2





85-year-old male who was seen in the office of the ear nose and throat doctor.  

The patient apparently developed some respiratory distress with stridor.  The 

patient has been unable to sleep for a couple days because of the breathing 

difficulty.  He is on was not able to even speak.  He apparently was status 

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  The patient had 

a tracheostomy performed yesterday.  He is postop day #1.  Transferred back 

here to the ICU on a trach collar.  Seemed pretty stable.  He did receive 1 

unit of packed red blood cells.  Currently he's got an IV of dextrose half-

normal saline with 20 of potassium chloride at 80 mL an hour.  The trach collar 

is 35%.  He was admitted on February 12th,  which is yesterday.  This morning, 

the patient's rather sleepy.  Doesn't appear to have any distress or issues.  

No complaints.  Minimal pain in the neck area.  His past medical history is 

positive for the laryngeal/vocal cord cancer, acid reflux disease, hypertension

, osteoarthritis, pulmonary embolism, anxiety, and he is status post both 

radiation and chemotherapy for his cancer.  He's also had right hip 

replacement.  Medications are reviewed.





On 02/14/2018 patient seen again in follow-up.  He sitting up in the chair, in 

no acute distress, 35% trach collar with O2 sat at 97%.  Patient is afebrile, 

his other vital signs stable.  There is some moderate amount of yellowish blood-

tinged secretions he is bringing up through his tracheostomy.  Lung sounds are 

positive for some scattered rhonchi, but good air entry noted bilaterally.  

Patient is tolerating soft foods diet.  He denies any acute complaints, denies 

any fever, denies any chills any chest pain.  He continues on IV Solu-Medrol, 

and Rocephin per ENT service. 








Objective





- Vital Signs


Vital signs: 


 Vital Signs











Temp  97.2 F L  02/14/18 07:00


 


Pulse  55 L  02/14/18 07:00


 


Resp  18   02/14/18 07:00


 


BP  108/58   02/14/18 07:00


 


Pulse Ox  97   02/14/18 07:00








 Intake & Output











 02/13/18 02/14/18 02/14/18





 18:59 06:59 18:59


 


Intake Total 1740 480 


 


Output Total 450  


 


Balance 1290 480 


 


Intake:   


 


  IV 1280 480 


 


    D5-0.45% NaCl with KCl 400  





    20Meq/l 1,000 ml @ 80 mls   





    /hr IV .[id number] [facility] Rx#:   





    [id number]   


 


    Magnesium Sulfate-D5w Pmx 400  





    1 gm In Dextrose/Water 1   





    100ml.bag @ 100 mls/hr   





    IVPB Q1H [facility] Rx#:   





    [id number]   


 


    Sodium Chloride 0.9% 1, 480 480 





    000 ml @ 60 mls/hr IV .   





    [id number] [facility] Rx#:[id number]   


 


  Intake, IV Titration 460  





  Amount   


 


    Lactated Ringers 1,000 ml 460  





    As IV .STK-MED ONE Rx#:   





    [id number]   


 


Output:   


 


  Urine 450  


 


Other:   


 


  Voiding Method Bedside Commode  


 


  # Voids 1 1 














- Exam


No acute distress, oriented 3.





HEENT examination is grossly unremarkable.  Mucous membranes are moist.  No 

oral lesions.





Neck supple.  Full range of motion.  No adenopathy thyromegaly or neck vein 

distention.  Fresh midline tracheostomy is noted.  Trach collar is noted.  

There is moderate amount of yellowish-green, slightly blood-tinged secretions 

noted from the tracheostomy





Cardiovascular examination reveals regular rhythm rate.  S1-S2 normal.  No S3 

or S4.  No discernible murmur noted.





Lungs reveal scattered rhonchi bilaterally.  Equal breath sounds are equal 

bilaterally.  No adventitious lung sounds including wheezes or crackles.





Abdomen soft bowel sounds are heard.  No masses or tenderness.





Extremities are intact.  No cyanosis clubbing or edema.





Skin is without rash or lesion.





Neurologic examination is brief but nonfocal.








- Labs


CBC & Chem 7: 


 02/14/18 08:47





 02/13/18 06:46


Labs: 


 Abnormal Lab Results - Last 24 Hours (Table)











  02/13/18 02/14/18 02/14/18 Range/Units





  20:46 07:41 08:47 


 


RBC    2.77 L  (4.30-5.90)  m/uL


 


Hgb    8.3 L D  (13.0-17.5)  gm/dL


 


Hct    25.9 L  (39.0-53.0)  %


 


Neutrophils #    8.2 H  (1.3-7.7)  k/uL


 


POC Glucose (mg/dL)  160 H  132 H   (75-99)  mg/dL














  02/14/18 Range/Units





  12:23 


 


RBC   (4.30-5.90)  m/uL


 


Hgb   (13.0-17.5)  gm/dL


 


Hct   (39.0-53.0)  %


 


Neutrophils #   (1.3-7.7)  k/uL


 


POC Glucose (mg/dL)  118 H  (75-99)  mg/dL














Assessment and Plan


Plan: 


Assessment:





History of laryngeal/vocal cord cancer, status post chemotherapy and radiation 

therapy, with developing shortness of breath and stridor, postop day #2 status 

post tracheostomy





History of gastroesophageal reflux disease





History of hypertension





History of osteoarthritis





History of pulmonary most him





History of anxiety





Plan:





Patient remains stable from pulmonary standpoint, is tolerating 35% trach 

collar very well, is able to expectorate moderate amount of phlegm through the 

tracheostomy.  Is tolerating soft foods diet.  No signs of any distress, no 

signs of any difficulty breathing.  Lung sounds are positive for scattered 

rhonchi, vital signs remain stable, patient is afebrile.  Continues on Rocephin 

and IV Solu-Medrol per Dr. Barnes.  Surgical specimen biopsy of the 

larynx is pending.  We'll continue to follow with you





I performed a history & physical examination of the patient and discussed their 

management with my nurse practitioner, Tanisha Morales.  I reviewed the nurse 

practitioner's note and agree with the documented findings and plan of care.  

Lung sounds are positive for scattered rhonchi.  The findings and the 

impression was discussed with the patient.  I attest to the documentation by 

the nurse practitioner. 











Time with Patient: Less than 30

## 2018-02-15 VITALS
TEMPERATURE: 96.7 F | RESPIRATION RATE: 16 BRPM | SYSTOLIC BLOOD PRESSURE: 125 MMHG | HEART RATE: 62 BPM | DIASTOLIC BLOOD PRESSURE: 58 MMHG

## 2018-02-15 LAB
GLUCOSE BLD-MCNC: 123 MG/DL (ref 75–99)
GLUCOSE BLD-MCNC: 143 MG/DL (ref 75–99)

## 2018-02-15 RX ADMIN — HEPARIN SODIUM SCH: 5000 INJECTION, SOLUTION INTRAVENOUS; SUBCUTANEOUS at 09:18

## 2018-02-15 RX ADMIN — INSULIN ASPART SCH UNIT: 100 INJECTION, SOLUTION INTRAVENOUS; SUBCUTANEOUS at 09:18

## 2018-02-15 RX ADMIN — PANTOPRAZOLE SODIUM SCH MG: 40 TABLET, DELAYED RELEASE ORAL at 09:17

## 2018-02-15 RX ADMIN — ATENOLOL SCH MG: 50 TABLET ORAL at 09:17

## 2018-02-15 RX ADMIN — CEFTRIAXONE SODIUM SCH MG: 1 INJECTION, POWDER, FOR SOLUTION INTRAMUSCULAR; INTRAVENOUS at 09:18

## 2018-02-15 RX ADMIN — INSULIN ASPART SCH: 100 INJECTION, SOLUTION INTRAVENOUS; SUBCUTANEOUS at 13:01

## 2018-02-15 RX ADMIN — CEFAZOLIN SCH: 330 INJECTION, POWDER, FOR SOLUTION INTRAMUSCULAR; INTRAVENOUS at 13:01

## 2018-02-15 RX ADMIN — LISINOPRIL SCH MG: 10 TABLET ORAL at 09:17

## 2018-02-15 RX ADMIN — ACETAMINOPHEN PRN MG: 325 TABLET, FILM COATED ORAL at 00:10

## 2018-02-15 NOTE — P.PN
Subjective


Progress Note Date: 02/15/18


Principal diagnosis: 


History of laryngeal/vocal cord cancer, status post chemotherapy and radiation 

therapy, status post tracheostomy, postop day 2





85-year-old male who was seen in the office of the ear nose and throat doctor.  

The patient apparently developed some respiratory distress with stridor.  The 

patient has been unable to sleep for a couple days because of the breathing 

difficulty.  He is on was not able to even speak.  He apparently was status 

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  The patient had 

a tracheostomy performed yesterday.  He is postop day #1.  Transferred back 

here to the ICU on a trach collar.  Seemed pretty stable.  He did receive 1 

unit of packed red blood cells.  Currently he's got an IV of dextrose half-

normal saline with 20 of potassium chloride at 80 mL an hour.  The trach collar 

is 35%.  He was admitted on February 12th,  which is yesterday.  This morning, 

the patient's rather sleepy.  Doesn't appear to have any distress or issues.  

No complaints.  Minimal pain in the neck area.  His past medical history is 

positive for the laryngeal/vocal cord cancer, acid reflux disease, hypertension

, osteoarthritis, pulmonary embolism, anxiety, and he is status post both 

radiation and chemotherapy for his cancer.  He's also had right hip 

replacement.  Medications are reviewed.





On 02/14/2018 patient seen again in follow-up.  He sitting up in the chair, in 

no acute distress, 35% trach collar with O2 sat at 97%.  Patient is afebrile, 

his other vital signs stable.  There is some moderate amount of yellowish blood-

tinged secretions he is bringing up through his tracheostomy.  Lung sounds are 

positive for some scattered rhonchi, but good air entry noted bilaterally.  

Patient is tolerating soft foods diet.  He denies any acute complaints, denies 

any fever, denies any chills any chest pain.  He continues on IV Solu-Medrol, 

and Rocephin per ENT service. 





On 02/15/2018 patient seen again in follow-up.  He sitting up in the chair, in 

no acute distress.  He denies any dyspnea, lung sounds are positive for a few 

scattered rhonchi, patient has been able to expectorate the phlegm through the 

tracheostomy, small amount of greenish phlegm.  He remains on 30% trach collar 

with O2 sat at 99 percent.  Vital signs are stable, he is afebrile.  Anticipate 

discharge today. 








Objective





- Vital Signs


Vital signs: 


 Vital Signs











Temp  97.4 F L  02/15/18 07:00


 


Pulse  72   02/15/18 07:00


 


Resp  18   02/15/18 07:00


 


BP  107/55   02/15/18 07:00


 


Pulse Ox  94 L  02/15/18 07:00








 Intake & Output











 02/14/18 02/15/18 02/15/18





 18:59 06:59 18:59


 


Intake Total 840 400 


 


Balance 840 400 


 


Intake:   


 


    


 


    Sodium Chloride 0.9% 1, 400  





    000 ml @ 60 mls/hr IV .   





    [id number] MILEY Rx#:[id number]   


 


  Oral 440 400 


 


Other:   


 


  # Voids  1 














- Exam


No acute distress, oriented 3.





HEENT examination is grossly unremarkable.  Mucous membranes are moist.  No 

oral lesions.





Neck supple.  Full range of motion.  No adenopathy thyromegaly or neck vein 

distention.  Fresh midline tracheostomy is noted.  Trach collar is noted.  

There is moderate amount of green, secretions noted from the tracheostomy





Cardiovascular examination reveals regular rhythm rate.  S1-S2 normal.  No S3 

or S4.  No discernible murmur noted.





Lungs reveal scattered rhonchi bilaterally.  Equal breath sounds are equal 

bilaterally.  No adventitious lung sounds including wheezes or crackles.





Abdomen soft bowel sounds are heard.  No masses or tenderness.





Extremities are intact.  No cyanosis clubbing or edema.





Skin is without rash or lesion.





Neurologic examination is brief but nonfocal.








- Labs


CBC & Chem 7: 


 02/14/18 08:47





 02/13/18 06:46


Labs: 


 Abnormal Lab Results - Last 24 Hours (Table)











  02/14/18 02/14/18 02/14/18 Range/Units





  12:23 17:05 20:39 


 


POC Glucose (mg/dL)  118 H  119 H  116 H  (75-99)  mg/dL


 


Magnesium     (1.6-2.3)  mg/dL














  02/15/18 02/15/18 Range/Units





  07:25 08:41 


 


POC Glucose (mg/dL)  143 H   (75-99)  mg/dL


 


Magnesium   1.4 L  (1.6-2.3)  mg/dL














Assessment and Plan


Plan: 


Assessment:





History of laryngeal/vocal cord cancer, status post chemotherapy and radiation 

therapy, with developing shortness of breath and stridor, postop day #3 status 

post tracheostomy





History of gastroesophageal reflux disease





History of hypertension





History of osteoarthritis





History of pulmonary most him





History of anxiety





Plan:





Patient is doing well, remains stable from pulmonary standpoint.  Home oxygen 

is being set up with FiO2 of 24% for humidification of his tracheostomy.  

Education will be provided for the patient and his wife regarding the care of 

his tracheostomy.  He denies any dyspnea, lung sounds are positive for a few 

scattered rhonchi which the patient is able to expectorate phlegm through the 

tracheostomy.  Vital signs are stable.  Anticipate discharge home today once 

the arrangements are all in place for home supplies and oxygen.  Antibiotics 

and steroids per ENT service. 





I performed a history & physical examination of the patient and discussed their 

management with my nurse practitioner, Tanisha Morales.  I reviewed the nurse 

practitioner's note and agree with the documented findings and plan of care.  

Lung sounds are positive for scattered rhonchi.  The findings and the 

impression was discussed with the patient.  I attest to the documentation by 

the nurse practitioner. 











Time with Patient: Less than 30

## 2018-02-16 NOTE — PN
PROGRESS NOTE



DATE OF SERVICE:

02/15/2018



CHIEF COMPLAINT:

Re-evaluation.



HISTORY OF PRESENT ILLNESS:

This is a 85-year-old gentleman was status post tracheostomy on an urgent basis.  The

patient has had a history of CA of the larynx previously treated.  The patient had

significant laryngeal edema and required this tracheostomy.  He is actually feeling

much better. His breathing has improved and overall he looks better.



REVIEW OF SYSTEMS:

NEURO:  Denies any headaches or dizziness.

PSYCH: Appears happy and content.

CARDIAC: Denies chest pain.

RESPIRATORY: Denies shortness of breath.  Does have some cough.

GI: No nausea, vomiting.  Eating well.  No abdominal pain or diarrhea.

: No symptoms.

EXTREMITIES: Mild chronic edema.

CONSTITUTIONAL: No fever or chills.



PHYSICAL EXAM:

Pleasant gentleman in no distress, sitting on the bedside.

Vital signs reveal temperature 97.4, pulse 72, respirations 18, blood pressure 107/55,

pulse ox 94%.  HEENT:  Normocephalic.

Neck with tracheostomy.

CHEST: Improved air flow.  Occasional rhonchi, scattered.

CARDIAC:  Distant heart sounds, S1, S2 with no gallops or murmurs.

ABDOMEN:  Soft.  Bowel sounds present.

EXTREMITIES: Trace edema right ankle.

NEUROLOGICAL: Awake, alert, oriented with well-coordinated movements.



LABORATORY ASSESSMENT:

Accu-Chek of 143.



PLAN:

Continue present medical regimen.  Patient's condition discussed with the patient.

Prognosis is guarded.  Potential discharge home today.





MMODL / IJN: [id number] / Job#: [id number]

## 2019-07-25 ENCOUNTER — HOSPITAL ENCOUNTER (OUTPATIENT)
Dept: HOSPITAL 47 - PROCWHC3 | Age: 84
End: 2019-07-25
Attending: INTERNAL MEDICINE
Payer: MEDICARE

## 2019-07-25 ENCOUNTER — HOSPITAL ENCOUNTER (OUTPATIENT)
Dept: HOSPITAL 47 - RADUSWWP | Age: 84
Discharge: HOME | End: 2019-07-25
Attending: INTERNAL MEDICINE
Payer: MEDICARE

## 2019-07-25 VITALS — TEMPERATURE: 98.1 F

## 2019-07-25 VITALS — DIASTOLIC BLOOD PRESSURE: 78 MMHG | HEART RATE: 97 BPM | SYSTOLIC BLOOD PRESSURE: 136 MMHG | RESPIRATION RATE: 20 BRPM

## 2019-07-25 DIAGNOSIS — K57.30: ICD-10-CM

## 2019-07-25 DIAGNOSIS — K21.9: ICD-10-CM

## 2019-07-25 DIAGNOSIS — J90: Primary | ICD-10-CM

## 2019-07-25 DIAGNOSIS — I10: ICD-10-CM

## 2019-07-25 DIAGNOSIS — Z79.899: ICD-10-CM

## 2019-07-25 DIAGNOSIS — J44.9: ICD-10-CM

## 2019-07-25 DIAGNOSIS — I25.10: ICD-10-CM

## 2019-07-25 DIAGNOSIS — M19.90: ICD-10-CM

## 2019-07-25 DIAGNOSIS — N40.0: ICD-10-CM

## 2019-07-25 DIAGNOSIS — Z87.891: ICD-10-CM

## 2019-07-25 DIAGNOSIS — Z85.21: ICD-10-CM

## 2019-07-25 LAB
CELL CNT PNL FLD: 100
DEPRECATED POLYS # FLD: 3 %
MONONUC CELLS # FLD: 96 %
NUC CELL # FLD: 1400 /UL
PROT FLD-MCNC: 4300 MG/DL

## 2019-07-25 PROCEDURE — 71045 X-RAY EXAM CHEST 1 VIEW: CPT

## 2019-07-25 PROCEDURE — 87070 CULTURE OTHR SPECIMN AEROBIC: CPT

## 2019-07-25 PROCEDURE — 88108 CYTOPATH CONCENTRATE TECH: CPT

## 2019-07-25 PROCEDURE — 32554 ASPIRATE PLEURA W/O IMAGING: CPT

## 2019-07-25 PROCEDURE — 87102 FUNGUS ISOLATION CULTURE: CPT

## 2019-07-25 PROCEDURE — 82945 GLUCOSE OTHER FLUID: CPT

## 2019-07-25 PROCEDURE — 83615 LACTATE (LD) (LDH) ENZYME: CPT

## 2019-07-25 PROCEDURE — 87529 HSV DNA AMP PROBE: CPT

## 2019-07-25 PROCEDURE — 87252 VIRUS INOCULATION TISSUE: CPT

## 2019-07-25 PROCEDURE — 87116 MYCOBACTERIA CULTURE: CPT

## 2019-07-25 PROCEDURE — 87634 RSV DNA/RNA AMP PROBE: CPT

## 2019-07-25 PROCEDURE — 87206 SMEAR FLUORESCENT/ACID STAI: CPT

## 2019-07-25 PROCEDURE — 84157 ASSAY OF PROTEIN OTHER: CPT

## 2019-07-25 PROCEDURE — 88305 TISSUE EXAM BY PATHOLOGIST: CPT

## 2019-07-25 PROCEDURE — 89050 BODY FLUID CELL COUNT: CPT

## 2019-07-25 PROCEDURE — 87205 SMEAR GRAM STAIN: CPT

## 2019-07-25 PROCEDURE — 76604 US EXAM CHEST: CPT

## 2019-07-25 PROCEDURE — 87498 ENTEROVIRUS PROBE&REVRS TRNS: CPT

## 2019-07-25 PROCEDURE — 87502 INFLUENZA DNA AMP PROBE: CPT

## 2019-07-25 PROCEDURE — 87496 CYTOMEG DNA AMP PROBE: CPT

## 2019-07-25 PROCEDURE — 87798 DETECT AGENT NOS DNA AMP: CPT

## 2019-07-25 NOTE — US
EXAMINATION TYPE: US chest

 

DATE OF EXAM: 7/25/2019

 

COMPARISON: Chest x-ray and CT from one week ago.

 

CLINICAL HISTORY: J90 Pleural Effusion. Right pleural effusion.

 

TECHNIQUE:  Targeted ultrasound of the posterior lower right hemithorax

 

EXAM MEASUREMENTS:

 

Right Pleural Effusion pocket size:  16.4 cm

**  Right skin surface to fluid distance:  2.5 

 

 

Right side marked for possible thoracentesis outside the dept.

 

Pulmonologists are able to review the images in the patient?s EMR.  

 

Right side marked for thoracentesis. Lung tissue visualized at 7.9 cm in pocket of fluid.

 

 

 

IMPRESSIONS: Fairly moderate to large right-sided pleural effusion remains present on 6 images saved 
with no significant left-sided effusion noted.

## 2019-07-25 NOTE — PCN
PROCEDURE NOTE



PROCEDURE:

Right thoracentesis.



PREOPERATIVE DIAGNOSIS:

Symptomatic right pleural effusion.



POSTOPERATIVE DIAGNOSIS:

Symptomatic right pleural effusion.



Indication  Pleural effusion.



A time-out was completed verifying correct patient, procedure, site, positioning, and

implant (s) or special equipment if applicable.



Ultrasound guidance was used and appropriate fluid pocket was identified and marked.

Patient was positioned, prepped and draped in usual sterile fashion.  Lidocaine was

used to anesthetize the area.  A Thoracentesis catheter was introduced into the pleural

space and fluid was removed.  Blood loss was none.



A chest x-ray was ordered to evaluate for pneumothorax.



Total Fluid Removed  1950 mL

Color of Fluid  Dark brown/bloody fluid

Fluid was sent for appropriate laboratory tests.



Patient tolerated the procedure well and there were no complications.





The patient will have a chest x-ray to make sure there was no complications.  There was

no immediate complications and he did tolerate the procedure well.  There was universal

timeout.  There was informed consent.  The wife was in the room when the procedure was

done.  Again, there were no issues.





MMODL / IJN: [id number] / Job#: [id number]

## 2019-07-25 NOTE — XR
EXAMINATION TYPE: XR chest 1V portable

 

DATE OF EXAM: 7/25/2019

 

COMPARISON: Chest x-ray and outside CT 1 week ago.

 

HISTORY: Right-sided pleural effusion status post thoracentesis

 

TECHNIQUE: Single AP portable frontal upright view of the chest is obtained.

 

FINDINGS:  There is some improvement in right-sided effusion after thoracentesis without pneumothorax
. Associated right basilar atelectasis is redemonstrated. No mediastinal shift. Left lung remains ashlee
ar. The cardiac silhouette size remains enlarged with atherosclerotic aorta.   The osseous structures
 are intact.

 

IMPRESSION:  No pneumothorax after right-sided thoracentesis.

## 2019-08-16 ENCOUNTER — HOSPITAL ENCOUNTER (OUTPATIENT)
Dept: HOSPITAL 47 - ORWHC2ENDO | Age: 84
Discharge: HOME | End: 2019-08-16
Attending: INTERNAL MEDICINE
Payer: MEDICARE

## 2019-08-16 VITALS — BODY MASS INDEX: 24.3 KG/M2

## 2019-08-16 VITALS — HEART RATE: 98 BPM | DIASTOLIC BLOOD PRESSURE: 68 MMHG | RESPIRATION RATE: 18 BRPM | SYSTOLIC BLOOD PRESSURE: 116 MMHG

## 2019-08-16 VITALS — TEMPERATURE: 97.6 F

## 2019-08-16 DIAGNOSIS — Z85.21: ICD-10-CM

## 2019-08-16 DIAGNOSIS — Z87.19: ICD-10-CM

## 2019-08-16 DIAGNOSIS — Z87.891: ICD-10-CM

## 2019-08-16 DIAGNOSIS — Z98.890: ICD-10-CM

## 2019-08-16 DIAGNOSIS — Z79.899: ICD-10-CM

## 2019-08-16 DIAGNOSIS — M10.9: ICD-10-CM

## 2019-08-16 DIAGNOSIS — K21.9: ICD-10-CM

## 2019-08-16 DIAGNOSIS — R06.02: Primary | ICD-10-CM

## 2019-08-16 DIAGNOSIS — I10: ICD-10-CM

## 2019-08-16 DIAGNOSIS — J44.9: ICD-10-CM

## 2019-08-16 DIAGNOSIS — I25.10: ICD-10-CM

## 2019-08-16 DIAGNOSIS — N40.0: ICD-10-CM

## 2019-08-16 DIAGNOSIS — F41.9: ICD-10-CM

## 2019-08-16 DIAGNOSIS — E78.5: ICD-10-CM

## 2019-08-16 DIAGNOSIS — M19.90: ICD-10-CM

## 2019-08-16 DIAGNOSIS — D64.9: ICD-10-CM

## 2019-08-16 DIAGNOSIS — Z96.641: ICD-10-CM

## 2019-08-16 DIAGNOSIS — Z92.3: ICD-10-CM

## 2019-08-16 LAB
CELL CNT PNL FLD: 100
DEPRECATED POLYS # FLD: 11 %
MONONUC CELLS # FLD: 89 %
NUC CELL # FLD: 244 /UL

## 2019-08-16 PROCEDURE — 31625 BRONCHOSCOPY W/BIOPSY(S): CPT

## 2019-08-16 PROCEDURE — 94640 AIRWAY INHALATION TREATMENT: CPT

## 2019-08-16 PROCEDURE — 89050 BODY FLUID CELL COUNT: CPT

## 2019-08-16 PROCEDURE — 31624 DX BRONCHOSCOPE/LAVAGE: CPT

## 2019-08-16 PROCEDURE — 31623 DX BRONCHOSCOPE/BRUSH: CPT

## 2019-08-16 PROCEDURE — 88104 CYTOPATH FL NONGYN SMEARS: CPT

## 2019-08-16 PROCEDURE — 88108 CYTOPATH CONCENTRATE TECH: CPT

## 2019-08-16 PROCEDURE — 88305 TISSUE EXAM BY PATHOLOGIST: CPT

## 2019-08-16 NOTE — PCN
PROCEDURE NOTE



:

Dr. Orr.



PROCEDURE:

Bronchoscopy.



ANESTHESIA:

Anesthesia provided general anesthesia unconscious sedation there was informed consent

and universal timeout.



PREOP DIAGNOSIS:

An is potential mass, right lower lobe, subglottic stenosis.



POSTOP DIAGNOSIS:

Same.



PROCEDURE:

Was a bronchoscopy airway examination, therapeutic lavage, BAL right lower lobe,

brushes right lower lobe endobronchial biopsies right lower lobe.  After the patient

was adequately sedated and being fully monitored, the bronchoscope was inserted through

the right nostril.  It passed through the right nasopharynx into the oropharynx.  The

hypopharynx was identified and top like.  Anterior commissure, true cords, false cords,

arytenoids, piriform sinuses, right and left vallecula and epiglottis all appeared

relatively normal.  After topicalization, the bronchoscope was pushed through the

glottic opening into the trachea.  The patient had significant subglottic stenosis from

his previous tracheostomy.  This may be causing the patient some of his difficulty in

breathing.  Next, after topicalization of the trachea, the bronchoscope was pushed down

to the tracheal marysol which was sharp.  Right and left mainstem were topicalized.  The

right upper lobe and its 3 segments, right lower lobe and its 2 segments, the left

upper lobe proper and its 2 segments the lingula and its 2 segments and the left lower

lobe and its 4 segments all appeared relatively normal.  On the left, the right lower

lobe of area showed some potential obstruction and blockage in one of the segments.  It

could be normal mucosa.  Hard to determine.  Anyway, this area was brushed and washed.

There was endobronchial biopsies performed as well.  It did look like it was primarily

involving the anterior bronchopulmonary segment of the right lower lobe and also the

medial bronchopulmonary segment of the right lower lobe.  Again, no distinct mass was

noted just looks like the mucosa was seen in the lower lobe regular.  There was no

significant bleeding.  The patient tolerated the procedure well.  Afterwards, we made

sure there was adequate hemostasis and the bronchoscope was withdrawn.  There was no

immediate complication.  The patient will be recovered.  Roro ASHBY / SALENA: [id number] / Job#: [id number]

## 2019-08-20 ENCOUNTER — HOSPITAL ENCOUNTER (EMERGENCY)
Dept: HOSPITAL 47 - EC | Age: 84
Discharge: HOME | End: 2019-08-20
Payer: MEDICARE

## 2019-08-20 VITALS — HEART RATE: 99 BPM

## 2019-08-20 VITALS — DIASTOLIC BLOOD PRESSURE: 79 MMHG | SYSTOLIC BLOOD PRESSURE: 128 MMHG

## 2019-08-20 VITALS — TEMPERATURE: 97.6 F

## 2019-08-20 VITALS — RESPIRATION RATE: 22 BRPM

## 2019-08-20 DIAGNOSIS — K21.9: ICD-10-CM

## 2019-08-20 DIAGNOSIS — Z92.21: ICD-10-CM

## 2019-08-20 DIAGNOSIS — Z79.890: ICD-10-CM

## 2019-08-20 DIAGNOSIS — Z96.641: ICD-10-CM

## 2019-08-20 DIAGNOSIS — M19.90: ICD-10-CM

## 2019-08-20 DIAGNOSIS — Z79.899: ICD-10-CM

## 2019-08-20 DIAGNOSIS — Z85.818: ICD-10-CM

## 2019-08-20 DIAGNOSIS — I10: ICD-10-CM

## 2019-08-20 DIAGNOSIS — J90: Primary | ICD-10-CM

## 2019-08-20 DIAGNOSIS — Z87.891: ICD-10-CM

## 2019-08-20 LAB
ANION GAP SERPL CALC-SCNC: 14 MMOL/L
APTT BLD: 28.2 SEC (ref 22–30)
BASOPHILS # BLD AUTO: 0 K/UL (ref 0–0.2)
BASOPHILS NFR BLD AUTO: 0 %
BUN SERPL-SCNC: 20 MG/DL (ref 9–20)
CALCIUM SPEC-MCNC: 7.6 MG/DL (ref 8.4–10.2)
CHLORIDE SERPL-SCNC: 93 MMOL/L (ref 98–107)
CO2 SERPL-SCNC: 23 MMOL/L (ref 22–30)
EOSINOPHIL # BLD AUTO: 0.1 K/UL (ref 0–0.7)
EOSINOPHIL NFR BLD AUTO: 2 %
ERYTHROCYTE [DISTWIDTH] IN BLOOD BY AUTOMATED COUNT: 2.85 M/UL (ref 4.3–5.9)
ERYTHROCYTE [DISTWIDTH] IN BLOOD: 14.6 % (ref 11.5–15.5)
GLUCOSE SERPL-MCNC: 99 MG/DL (ref 74–99)
HCT VFR BLD AUTO: 25.1 % (ref 39–53)
HGB BLD-MCNC: 8.4 GM/DL (ref 13–17.5)
INR PPP: 1 (ref ?–1.2)
LYMPHOCYTES # SPEC AUTO: 1.3 K/UL (ref 1–4.8)
LYMPHOCYTES NFR SPEC AUTO: 15 %
MCH RBC QN AUTO: 29.5 PG (ref 25–35)
MCHC RBC AUTO-ENTMCNC: 33.4 G/DL (ref 31–37)
MCV RBC AUTO: 88.2 FL (ref 80–100)
MONOCYTES # BLD AUTO: 0.6 K/UL (ref 0–1)
MONOCYTES NFR BLD AUTO: 7 %
NEUTROPHILS # BLD AUTO: 6.2 K/UL (ref 1.3–7.7)
NEUTROPHILS NFR BLD AUTO: 74 %
PLATELET # BLD AUTO: 434 K/UL (ref 150–450)
POTASSIUM SERPL-SCNC: 3.5 MMOL/L (ref 3.5–5.1)
PT BLD: 10.8 SEC (ref 9–12)
SODIUM SERPL-SCNC: 130 MMOL/L (ref 137–145)
WBC # BLD AUTO: 8.3 K/UL (ref 3.8–10.6)

## 2019-08-20 PROCEDURE — 71046 X-RAY EXAM CHEST 2 VIEWS: CPT

## 2019-08-20 PROCEDURE — 76604 US EXAM CHEST: CPT

## 2019-08-20 PROCEDURE — 99285 EMERGENCY DEPT VISIT HI MDM: CPT

## 2019-08-20 PROCEDURE — 85730 THROMBOPLASTIN TIME PARTIAL: CPT

## 2019-08-20 PROCEDURE — 32555 ASPIRATE PLEURA W/ IMAGING: CPT

## 2019-08-20 PROCEDURE — 85025 COMPLETE CBC W/AUTO DIFF WBC: CPT

## 2019-08-20 PROCEDURE — 80048 BASIC METABOLIC PNL TOTAL CA: CPT

## 2019-08-20 PROCEDURE — 93005 ELECTROCARDIOGRAM TRACING: CPT

## 2019-08-20 PROCEDURE — 36415 COLL VENOUS BLD VENIPUNCTURE: CPT

## 2019-08-20 PROCEDURE — 85610 PROTHROMBIN TIME: CPT

## 2019-08-20 PROCEDURE — 71045 X-RAY EXAM CHEST 1 VIEW: CPT

## 2019-08-20 NOTE — US
EXAMINATION TYPE: US thoracentesis

 

DATE OF EXAM: 8/20/2019

 

COMPARISON: Ultrasound chest 8/20/2019

 

HISTORY: Pleural effusion.

 

FINDINGS: Maximal barrier technique was utilized.  The skin overlying a suitable pocket of fluid was 
localized and the overlying skin prepped and draped.  Lidocaine was used for local anesthesia. Ultras
ound was used with sterile technique.  A 5 [place] catheter over guide needle was advanced into the pl
eural fluid collection using ultrasound guidance and the catheter advanced, needle removed.  Approxim
ately 1.2 liter(s) of serous stephan fluid was removed.  Catheter was withdrawn and hemostasis achieved
.  There is no immediate complication.  The patient discharged in stable condition without complicati
on.

 

IMPRESSION: STATUS POST ULTRASOUND GUIDED THORACENTESIS, POST PROCEDURE CHEST X-RAY PENDING.  THIS NE
OCEDURE WAS PERFORMED BY THE UNDERSIGNED. Of note the effusion appears loculated.

## 2019-08-20 NOTE — US
EXAMINATION TYPE: US chest

 

DATE OF EXAM: 8/20/2019

 

COMPARISON: Chest x-ray earlier today.

 

CLINICAL HISTORY: Pain. Right pleural effusion

 

TECHNIQUE:  Targeted ultrasound of the posterior lower right hemithorax

 

EXAM MEASUREMENTS:

 

Right Pleural Effusion pocket size:  11.3 cm

**  Right skin surface to fluid distance:  2.3 cm

**  Multiple septations and debris seen within fluid pocket

 

Right side MARKED for possible thoracentesis outside the dept.

 

Pulmonologists are able to review the images in the patient?s EMR.  

 

 

 

 

 

IMPRESSIONS: Confirmation of recurrent moderate to large size right pleural effusion which is nonsimp
le in appearance as is not completely anechoic.

## 2019-08-20 NOTE — XR
EXAMINATION TYPE: XR chest 2V

 

DATE OF EXAM: 8/20/2019

 

COMPARISON: Outside chest CT July 18, 2019. Two view chest xray August 12, 2019

 

HISTORY: Shortness of breath. Recurrent right-sided effusions.

 

TECHNIQUE:  Frontal and lateral views of the chest are obtained.

 

FINDINGS:  There is moderate right-sided pleural effusion or fluid collection increased in size from 
most recent x-ray. Associated compressive atelectasis is present. No mediastinal shift is seen.  The 
cardiac silhouette size is stable and now upper limits of normal. New patchy left basilar opacity tho
ught present. No left-sided effusion is seen. The osseous structures are intact.

 

IMPRESSION:  Moderate-sized right pleural fluid collection or effusion increased in size from x-ray 8
 days earlier. No new mediastinal shift. New patchy left basilar acute infiltrate and/or atelectasis 
is noted.

## 2019-08-20 NOTE — XR
EXAMINATION TYPE: XR chest 1V portable

 

DATE OF EXAM: 8/20/2019

 

Comparison: 8/20/2019

 

Clinical History: 87-year-old male post right thoracentesis

 

Findings:

Right heart margin obscured by adjacent pleural parenchymal opacity. Residual moderate sized right pl
eural effusion, decreased from prior. No appreciable pneumothorax. Improved aeration at the left base
.

 

 

Impression:

Residual moderate right pleural effusion after thoracentesis. No appreciable pneumothorax.

## 2019-08-20 NOTE — ED
General Adult HPI





- General


Chief complaint: Shortness of Breath


Stated complaint: Sob/fluid on rt lung


Time Seen by Provider: 08/20/19 09:17


Source: patient


Mode of arrival: wheelchair


Limitations: no limitations, physical limitation





- History of Present Illness


Initial comments: 





Dictation was produced using dragon dictation software. please excuse any 

grammatical, word or spelling errors. 





Chief Complaint: 87-year-old male with past medical history of right pleural 

effusion presents with dyspnea.





History of Present Illness: His 87-year-old male he is past medical history of 

pulmonary embolus hypertension GERD.  Presents today with worsening shortness of

breath.  Patient has been progressively short of breath over the last several 

days.  Patient has pleural effusion that have been ongoing for the last month.  

His first paracentesis was a month ago.  He had another thoracentesis performed 

3 weeks ago.  Patient had bronchoscopy last week.  Patient and family member 

reported that it's unclear why patient has these recurring pleural effusions.  

Patient denies any fever, chills or night sweats.  Patient's wife called 

pulmonologist Dr. Arroyo's office earlier today and was told to come to the 

emergency department to have his pleural effusion drained by Dr. Metzger.








The ROS documented in this emergency department record has been reviewed and 

confirmed by me.  Those systems with pertinent positive or negative responses 

have been documented in the HPI.  All other systems are other negative and/or 

noncontributory.








PHYSICAL EXAM:


General Impression: Alert and oriented x3, mildly dyspneic


HEENT: Normocephalic atraumatic, extra-ocular movements intact, pupils equal and

reactive to light bilaterally, mucous membranes moist.


Cardiovascular: Heart regular rate and rhythm, S1&S2 audible, no murmurs, rubs 

or gallops


Chest: Diminished lung sounds in the right lung base


Abdomen: Bowel sounds present, abdomen soft, non-tender, non-distended, no 

organomegaly


Musculoskeletal: Pulses present and equal in all extremities, no peripheral 

edema


Motor:  no focal deficits noted


Neurological: CN II-XII grossly intact, no focal motor or sensory deficits noted


Skin: Intact with no visualized rashes


Psych: Normal affect and mood





ED course: 87-year-old male presents with chief complaint dyspnea.  Patient's 

long-standing history of recurring pleural effusions.  Signs upon arrival are 

within acceptable limits.  He is however mildly tachypneic and showing some 

signs of respiratory distress.  Discussed patient case with Dr. Metzger who says 

he is unable to do the thoracentesis so given that he has other things to attend

to an intensive care unit at this time.Chest x-ray shows moderate-sized right 

pleural effusion.  Extremities earlier.  Chest ultrasound was ordered showing a 

right pleural effusion.  Interventional radiology performed thoracentesis with 

removal of large amount of pleural fluid.  Patient appears well at this time.  

Discussed patient case Dr. Roberto who reports that after thoracentesis patient 

can be discharged if he is in stable clinical condition.  Patient ambulated 

around the emergency department and feels that his shortness of breath to 

significantly improved.  Postprocedure imaging was obtained showing no acute 

processes.  Patient to be discharge.  Told to follow up with pulmonologist.  

Return parameters discussed.








EKG interpretation: Ventricular rate 106, sinus tachycardia, MO interval 176, 

QRS 90, . No MO prolongation, no QTC prolongation, no ST or T-wave 

changes noted.  .  Overall, this EKG is unremarkable








- Related Data


                                Home Medications











 Medication  Instructions  Recorded  Confirmed


 


Acetaminophen [Tylenol Extra 1,000 mg PO BID PRN 02/12/18 08/20/19





Strength]   


 


Multivitamin/Iron/Folic Acid 1 tab PO DAILY 02/12/18 08/20/19





[Centrum Complete Multivit Tab]   


 


Omeprazole [PriLOSEC] 20 mg PO DAILY 02/12/18 08/20/19


 


Tamsulosin HCl [Flomax] 0.4 mg PO HS 02/12/18 08/20/19


 


amLODIPine BESYLATE/BENAZEPRIL 1 cap PO DAILY 02/12/18 08/20/19





[amLODIPine BESYLATE/BENAZEPRIL   





10-40 mg]   


 


ALPRAZolam [Xanax] 0.25 mg PO BID PRN 08/13/19 08/20/19


 


Melatonin 5 mg PO HS 08/13/19 08/20/19


 


Thiamine [Vitamin B-1] 100 mg PO DAILY 08/13/19 08/20/19


 


Ferrous Sulfate [Feosol] 325 mg PO DAILY 08/15/19 08/20/19


 


Levothyroxine Sodium [Synthroid] 25 mcg PO DAILY 08/15/19 08/20/19


 


Furosemide [Lasix] 20 mg PO DAILY 08/20/19 08/20/19


 


Potassium Chloride [Klor-Con 10] 10 meq PO DAILY 08/20/19 08/20/19











                                    Allergies











Allergy/AdvReac Type Severity Reaction Status Date / Time


 


No Known Allergies Allergy   Verified 08/20/19 09:29














Review of Systems


ROS Statement: 


Those systems with pertinent positive or pertinent negative responses have been 

documented in the HPI.





ROS Other: All systems not noted in ROS Statement are negative.





Past Medical History


Past Medical History: Cancer, GERD/Reflux, Hypertension, Osteoarthritis (OA), 

Pulmonary Embolus (PE)


Additional Past Medical History / Comment(s): throat cancer RADIATION 

CHEMOTHERAPY completed.  wife states that pt's throat collapsed after tx and 

required trach for 11 months, and that currently pt is experiencing rt lung 

filling with fluid


History of Any Multi-Drug Resistant Organisms: None Reported


Past Surgical History: Orthopedic Surgery


Additional Past Surgical History / Comment(s): RIGHT HIP REPLACEMENT.  trach


Past Anesthesia/Blood Transfusion Reactions: No Reported Reaction


Additional Past Anesthesia/Blood Transfusion Reaction / Comment(s): HAD 2 UNITS 

DURING CHEMO AT [facility]


Past Psychological History: No Psychological Hx Reported


Smoking Status: Former smoker


Past Alcohol Use History: Daily


Past Drug Use History: None Reported





- Past Family History


  ** Father


Family Medical History: Cancer


Additional Family Medical History / Comment(s): KIDNEY CANCER





  ** Mother


Family Medical History: Myocardial Infarction (MI), Respiratory Disorder





General Exam


Limitations: no limitations, physical limitation





Course


                                   Vital Signs











  08/20/19 08/20/19 08/20/19





  09:13 10:22 11:20


 


Temperature 97.6 F  


 


Pulse Rate 90 89 


 


Pulse Rate [   102 H





Pulse Oximetery   





]   


 


Respiratory 22 25 H 22





Rate   


 


Blood Pressure 150/73 143/90 


 


Blood Pressure   125/79





[Left Arm]   


 


O2 Sat by Pulse 94 L 96 90 L





Oximetry   














  08/20/19





  12:39


 


Temperature 


 


Pulse Rate 100


 


Pulse Rate [ 





Pulse Oximetery 





] 


 


Respiratory 22





Rate 


 


Blood Pressure 129/84


 


Blood Pressure 





[Left Arm] 


 


O2 Sat by Pulse 95





Oximetry 














Medical Decision Making





- Lab Data


Result diagrams: 


                                 08/20/19 09:35





                                 08/20/19 09:35


                                   Lab Results











  08/20/19 08/20/19 08/20/19 Range/Units





  09:35 09:35 09:35 


 


WBC   8.3   (3.8-10.6)  k/uL


 


RBC   2.85 L   (4.30-5.90)  m/uL


 


Hgb   8.4 L   (13.0-17.5)  gm/dL


 


Hct   25.1 L   (39.0-53.0)  %


 


MCV   88.2   (80.0-100.0)  fL


 


MCH   29.5   (25.0-35.0)  pg


 


MCHC   33.4   (31.0-37.0)  g/dL


 


RDW   14.6   (11.5-15.5)  %


 


Plt Count   434   (150-450)  k/uL


 


Neutrophils %   74   %


 


Lymphocytes %   15   %


 


Monocytes %   7   %


 


Eosinophils %   2   %


 


Basophils %   0   %


 


Neutrophils #   6.2   (1.3-7.7)  k/uL


 


Lymphocytes #   1.3   (1.0-4.8)  k/uL


 


Monocytes #   0.6   (0-1.0)  k/uL


 


Eosinophils #   0.1   (0-0.7)  k/uL


 


Basophils #   0.0   (0-0.2)  k/uL


 


PT    10.8  (9.0-12.0)  sec


 


INR    1.0  (<1.2)  


 


APTT    28.2  (22.0-30.0)  sec


 


Sodium  130 L    (137-145)  mmol/L


 


Potassium  3.5    (3.5-5.1)  mmol/L


 


Chloride  93 L    ()  mmol/L


 


Carbon Dioxide  23    (22-30)  mmol/L


 


Anion Gap  14    mmol/L


 


BUN  20    (9-20)  mg/dL


 


Creatinine  0.91    (0.66-1.25)  mg/dL


 


Est GFR (CKD-EPI)AfAm  87    (>60 ml/min/1.73 sqM)  


 


Est GFR (CKD-EPI)NonAf  76    (>60 ml/min/1.73 sqM)  


 


Glucose  99    (74-99)  mg/dL


 


Calcium  7.6 L    (8.4-10.2)  mg/dL














Disposition


Clinical Impression: 


 Pleural effusion





Disposition: HOME SELF-CARE


Condition: Good


Instructions (If sedation given, give patient instructions):  Pleural Effusion 

(ED)


Is patient prescribed a controlled substance at d/c from ED?: No


Referrals: 


Trae Orr DO [Doctor of Osteopathic Medicine] - 1-2 days


Time of Disposition: 12:47

## 2019-08-25 ENCOUNTER — HOSPITAL ENCOUNTER (INPATIENT)
Dept: HOSPITAL 47 - EC | Age: 84
LOS: 5 days | Discharge: HOME HEALTH | DRG: 187 | End: 2019-08-30
Attending: INTERNAL MEDICINE | Admitting: INTERNAL MEDICINE
Payer: MEDICARE

## 2019-08-25 VITALS — BODY MASS INDEX: 23.1 KG/M2

## 2019-08-25 DIAGNOSIS — Z80.51: ICD-10-CM

## 2019-08-25 DIAGNOSIS — Z98.890: ICD-10-CM

## 2019-08-25 DIAGNOSIS — M19.90: ICD-10-CM

## 2019-08-25 DIAGNOSIS — R33.9: ICD-10-CM

## 2019-08-25 DIAGNOSIS — E03.9: ICD-10-CM

## 2019-08-25 DIAGNOSIS — J90: Primary | ICD-10-CM

## 2019-08-25 DIAGNOSIS — K21.9: ICD-10-CM

## 2019-08-25 DIAGNOSIS — I25.10: ICD-10-CM

## 2019-08-25 DIAGNOSIS — Z85.21: ICD-10-CM

## 2019-08-25 DIAGNOSIS — R19.7: ICD-10-CM

## 2019-08-25 DIAGNOSIS — D64.9: ICD-10-CM

## 2019-08-25 DIAGNOSIS — E78.5: ICD-10-CM

## 2019-08-25 DIAGNOSIS — E87.6: ICD-10-CM

## 2019-08-25 DIAGNOSIS — Z86.711: ICD-10-CM

## 2019-08-25 DIAGNOSIS — E83.42: ICD-10-CM

## 2019-08-25 DIAGNOSIS — Z92.3: ICD-10-CM

## 2019-08-25 DIAGNOSIS — J98.11: ICD-10-CM

## 2019-08-25 DIAGNOSIS — Z82.49: ICD-10-CM

## 2019-08-25 DIAGNOSIS — Z96.641: ICD-10-CM

## 2019-08-25 DIAGNOSIS — Z85.819: ICD-10-CM

## 2019-08-25 DIAGNOSIS — Z79.899: ICD-10-CM

## 2019-08-25 DIAGNOSIS — Z92.21: ICD-10-CM

## 2019-08-25 DIAGNOSIS — E22.2: ICD-10-CM

## 2019-08-25 DIAGNOSIS — M10.9: ICD-10-CM

## 2019-08-25 DIAGNOSIS — Z79.890: ICD-10-CM

## 2019-08-25 DIAGNOSIS — Z87.891: ICD-10-CM

## 2019-08-25 DIAGNOSIS — J94.2: ICD-10-CM

## 2019-08-25 DIAGNOSIS — N40.1: ICD-10-CM

## 2019-08-25 DIAGNOSIS — I11.9: ICD-10-CM

## 2019-08-25 LAB
ALBUMIN SERPL-MCNC: 3.3 G/DL (ref 3.5–5)
ALP SERPL-CCNC: 71 U/L (ref 38–126)
ALT SERPL-CCNC: 32 U/L (ref 21–72)
ANION GAP SERPL CALC-SCNC: 11 MMOL/L
ANION GAP SERPL CALC-SCNC: 14 MMOL/L
APTT BLD: 27.1 SEC (ref 22–30)
AST SERPL-CCNC: 29 U/L (ref 17–59)
BASOPHILS # BLD AUTO: 0 K/UL (ref 0–0.2)
BASOPHILS NFR BLD AUTO: 0 %
BUN SERPL-SCNC: 17 MG/DL (ref 9–20)
BUN SERPL-SCNC: 19 MG/DL (ref 9–20)
CALCIUM SPEC-MCNC: 7.2 MG/DL (ref 8.4–10.2)
CALCIUM SPEC-MCNC: 7.2 MG/DL (ref 8.4–10.2)
CHLORIDE SERPL-SCNC: 88 MMOL/L (ref 98–107)
CHLORIDE SERPL-SCNC: 88 MMOL/L (ref 98–107)
CO2 SERPL-SCNC: 22 MMOL/L (ref 22–30)
CO2 SERPL-SCNC: 24 MMOL/L (ref 22–30)
EOSINOPHIL # BLD AUTO: 0.1 K/UL (ref 0–0.7)
EOSINOPHIL NFR BLD AUTO: 1 %
ERYTHROCYTE [DISTWIDTH] IN BLOOD BY AUTOMATED COUNT: 2.83 M/UL (ref 4.3–5.9)
ERYTHROCYTE [DISTWIDTH] IN BLOOD: 14.6 % (ref 11.5–15.5)
GLUCOSE SERPL-MCNC: 137 MG/DL (ref 74–99)
GLUCOSE SERPL-MCNC: 155 MG/DL (ref 74–99)
HCT VFR BLD AUTO: 24.6 % (ref 39–53)
HGB BLD-MCNC: 8.4 GM/DL (ref 13–17.5)
INR PPP: 1.1 (ref ?–1.2)
LYMPHOCYTES # SPEC AUTO: 1.1 K/UL (ref 1–4.8)
LYMPHOCYTES NFR SPEC AUTO: 11 %
MAGNESIUM SPEC-SCNC: 0.5 MG/DL (ref 1.6–2.3)
MAGNESIUM SPEC-SCNC: 0.9 MG/DL (ref 1.6–2.3)
MCH RBC QN AUTO: 29.6 PG (ref 25–35)
MCHC RBC AUTO-ENTMCNC: 34.1 G/DL (ref 31–37)
MCV RBC AUTO: 86.9 FL (ref 80–100)
MONOCYTES # BLD AUTO: 0.4 K/UL (ref 0–1)
MONOCYTES NFR BLD AUTO: 4 %
NEUTROPHILS # BLD AUTO: 7.6 K/UL (ref 1.3–7.7)
NEUTROPHILS NFR BLD AUTO: 82 %
PH UR: 5.5 [PH] (ref 5–8)
PLATELET # BLD AUTO: 377 K/UL (ref 150–450)
POTASSIUM SERPL-SCNC: 3.2 MMOL/L (ref 3.5–5.1)
POTASSIUM SERPL-SCNC: 3.5 MMOL/L (ref 3.5–5.1)
PROT SERPL-MCNC: 6.3 G/DL (ref 6.3–8.2)
PROT UR QL: (no result)
PT BLD: 11.3 SEC (ref 9–12)
RBC UR QL: <1 /HPF (ref 0–5)
SODIUM SERPL-SCNC: 123 MMOL/L (ref 137–145)
SODIUM SERPL-SCNC: 124 MMOL/L (ref 137–145)
SP GR UR: 1.01 (ref 1–1.03)
UROBILINOGEN UR QL STRIP: <2 MG/DL (ref ?–2)
WBC # BLD AUTO: 9.3 K/UL (ref 3.8–10.6)
WBC #/AREA URNS HPF: <1 /HPF (ref 0–5)

## 2019-08-25 PROCEDURE — 81001 URINALYSIS AUTO W/SCOPE: CPT

## 2019-08-25 PROCEDURE — 99285 EMERGENCY DEPT VISIT HI MDM: CPT

## 2019-08-25 PROCEDURE — 85610 PROTHROMBIN TIME: CPT

## 2019-08-25 PROCEDURE — 83735 ASSAY OF MAGNESIUM: CPT

## 2019-08-25 PROCEDURE — 96375 TX/PRO/DX INJ NEW DRUG ADDON: CPT

## 2019-08-25 PROCEDURE — 84484 ASSAY OF TROPONIN QUANT: CPT

## 2019-08-25 PROCEDURE — 88305 TISSUE EXAM BY PATHOLOGIST: CPT

## 2019-08-25 PROCEDURE — 80048 BASIC METABOLIC PNL TOTAL CA: CPT

## 2019-08-25 PROCEDURE — 80053 COMPREHEN METABOLIC PANEL: CPT

## 2019-08-25 PROCEDURE — 85730 THROMBOPLASTIN TIME PARTIAL: CPT

## 2019-08-25 PROCEDURE — 86850 RBC ANTIBODY SCREEN: CPT

## 2019-08-25 PROCEDURE — 71046 X-RAY EXAM CHEST 2 VIEWS: CPT

## 2019-08-25 PROCEDURE — 93306 TTE W/DOPPLER COMPLETE: CPT

## 2019-08-25 PROCEDURE — 86901 BLOOD TYPING SEROLOGIC RH(D): CPT

## 2019-08-25 PROCEDURE — 84295 ASSAY OF SERUM SODIUM: CPT

## 2019-08-25 PROCEDURE — 83605 ASSAY OF LACTIC ACID: CPT

## 2019-08-25 PROCEDURE — 74230 X-RAY XM SWLNG FUNCJ C+: CPT

## 2019-08-25 PROCEDURE — 71045 X-RAY EXAM CHEST 1 VIEW: CPT

## 2019-08-25 PROCEDURE — 96365 THER/PROPH/DIAG IV INF INIT: CPT

## 2019-08-25 PROCEDURE — 85025 COMPLETE CBC W/AUTO DIFF WBC: CPT

## 2019-08-25 PROCEDURE — 86900 BLOOD TYPING SEROLOGIC ABO: CPT

## 2019-08-25 PROCEDURE — 83880 ASSAY OF NATRIURETIC PEPTIDE: CPT

## 2019-08-25 PROCEDURE — 96366 THER/PROPH/DIAG IV INF ADDON: CPT

## 2019-08-25 PROCEDURE — 85027 COMPLETE CBC AUTOMATED: CPT

## 2019-08-25 PROCEDURE — 84100 ASSAY OF PHOSPHORUS: CPT

## 2019-08-25 PROCEDURE — 84300 ASSAY OF URINE SODIUM: CPT

## 2019-08-25 PROCEDURE — 83930 ASSAY OF BLOOD OSMOLALITY: CPT

## 2019-08-25 PROCEDURE — 93005 ELECTROCARDIOGRAM TRACING: CPT

## 2019-08-25 PROCEDURE — 88108 CYTOPATH CONCENTRATE TECH: CPT

## 2019-08-25 PROCEDURE — 84443 ASSAY THYROID STIM HORMONE: CPT

## 2019-08-25 PROCEDURE — 36415 COLL VENOUS BLD VENIPUNCTURE: CPT

## 2019-08-25 PROCEDURE — 51702 INSERT TEMP BLADDER CATH: CPT

## 2019-08-25 PROCEDURE — 83935 ASSAY OF URINE OSMOLALITY: CPT

## 2019-08-25 RX ADMIN — ACETAMINOPHEN PRN MG: 325 TABLET, FILM COATED ORAL at 19:53

## 2019-08-25 RX ADMIN — MAGNESIUM SULFATE IN DEXTROSE SCH MLS/HR: 10 INJECTION, SOLUTION INTRAVENOUS at 12:36

## 2019-08-25 RX ADMIN — MAGNESIUM SULFATE IN DEXTROSE SCH MLS/HR: 10 INJECTION, SOLUTION INTRAVENOUS at 13:48

## 2019-08-25 RX ADMIN — Medication PRN MG: at 22:54

## 2019-08-25 RX ADMIN — LISINOPRIL SCH: 20 TABLET ORAL at 16:15

## 2019-08-25 RX ADMIN — TAMSULOSIN HYDROCHLORIDE SCH MG: 0.4 CAPSULE ORAL at 19:53

## 2019-08-25 NOTE — XR
EXAMINATION TYPE: XR chest 2V

 

DATE OF EXAM: 8/25/2019

 

COMPARISON: Chest x-ray 5 days ago.

 

HISTORY: Weakness.

 

TECHNIQUE:  Frontal and lateral views of the chest are obtained.

 

FINDINGS:  There is moderate to large right pleural effusion with associated compressive atelectasis.
  No mediastinal shift. Patchy left basilar atelectasis and/or infiltrate. The cardiac silhouette siz
e is stable and mildly enlarged with atherosclerotic aorta.   The osseous structures are intact.

 

IMPRESSION:  Mild cardiomegaly with patchy left basilar acute atelectasis and/or infiltrate. Stable m
oderate size right pleural effusion. No significant change from prior. Daily Assessment

## 2019-08-25 NOTE — ED
General Adult HPI





- General


Chief complaint: Weakness


Stated complaint: bilat leg weakness


Time Seen by Provider: 08/25/19 10:40


Source: patient, RN notes reviewed


Mode of arrival: wheelchair


Limitations: no limitations





- History of Present Illness


Initial comments: 





This is an 87-year-old male with past medical history significant for pleural 

fusions needing thoracentesis.  Patient states she's had it done twice now.  

Patient states he had the last one done on Tuesday and starting on Wednesday a 

little difficulty breathing but on Friday and Saturday he gets significantly 

worse.  Patient also states his weakness got so bad that he can barely stand 

because both his legs are weak.  Patient denies any chest pain or palpitations. 

Patient denies any lightheadedness or dizziness per patient any headache patient

denies numbness weakness.  Patient denies any recent fever chills or cough.  

Patient denies abdominal pain patient denies nausea vomiting diarrhea.  Patient 

denies any recent falls or trauma.  Patient states the swelling in his legs of 

gotten worse over the last few weeks.





- Related Data


                                Home Medications











 Medication  Instructions  Recorded  Confirmed


 


Multivitamin/Iron/Folic Acid 1 tab PO DAILY 02/12/18 08/25/19





[Centrum Complete Multivit Tab]   


 


Omeprazole [PriLOSEC] 20 mg PO DAILY 02/12/18 08/25/19


 


Tamsulosin HCl [Flomax] 0.4 mg PO HS 02/12/18 08/25/19


 


amLODIPine BESYLATE/BENAZEPRIL 1 cap PO DAILY 02/12/18 08/25/19





[amLODIPine BESYLATE/BENAZEPRIL   





10-40 mg]   


 


Thiamine [Vitamin B-1] 100 mg PO DAILY 08/13/19 08/25/19


 


Ferrous Sulfate [Feosol] 325 mg PO DAILY 08/15/19 08/25/19


 


Furosemide [Lasix] 20 mg PO DAILY 08/20/19 08/25/19


 


Potassium Chloride [Klor-Con 10] 10 meq PO DAILY 08/20/19 08/25/19


 


Acetaminophen/Diphenhydramine 1 tab PO HS 08/25/19 08/25/19





[Tylenol -25mg]   


 


Levothyroxine Sodium [Synthroid] 50 mcg PO DAILY 08/25/19 08/25/19











                                    Allergies











Allergy/AdvReac Type Severity Reaction Status Date / Time


 


No Known Allergies Allergy   Verified 08/25/19 10:49














Review of Systems


ROS Statement: 


Those systems with pertinent positive or pertinent negative responses have been 

documented in the HPI.





ROS Other: All systems not noted in ROS Statement are negative.





Past Medical History


Past Medical History: Cancer, GERD/Reflux, Hypertension, Osteoarthritis (OA), 

Pulmonary Embolus (PE)


Additional Past Medical History / Comment(s): throat cancer RADIATION 

CHEMOTHERAPY completed.  wife states that pt's throat collapsed after tx and 

required trach for 11 months, and that currently pt is experiencing rt lung 

filling with fluid


History of Any Multi-Drug Resistant Organisms: None Reported


Past Surgical History: Orthopedic Surgery


Additional Past Surgical History / Comment(s): RIGHT HIP REPLACEMENT.  trach


Past Anesthesia/Blood Transfusion Reactions: No Reported Reaction


Additional Past Anesthesia/Blood Transfusion Reaction / Comment(s): HAD 2 UNITS 

DURING CHEMO AT [facility]


Past Psychological History: No Psychological Hx Reported


Smoking Status: Former smoker


Past Alcohol Use History: Daily


Past Drug Use History: None Reported





- Past Family History


  ** Father


Family Medical History: Cancer


Additional Family Medical History / Comment(s): KIDNEY CANCER





  ** Mother


Family Medical History: Myocardial Infarction (MI), Respiratory Disorder





General Exam





- General Exam Comments


Initial Comments: 





GENERAL:


Patient is well-developed and well-nourished.  Patient is nontoxic and well-

hydrated and is in mild distress.





ENT:


Neck is soft and supple.  No significant lymphadenopathy is noted.  Oropharynx 

is clear.  Moist mucous membranes.  Neck has full range of motion without 

eliciting any pain. 





EYES:


The sclera were anicteric and conjunctiva were pink and moist.  Extraocular 

movements were intact and pupils were equal round and reactive to light.  

Eyelids were unremarkable.





PULMONARY:


Unlabored respirations.  Patient has decreased breath sounds in the right base. 

 No audible rales rhonchi or wheezing was noted.





CARDIOVASCULAR:


There is a regular rate and rhythm without any murmurs gallops or rubs.   





ABDOMEN:


Soft and nontender with normal bowel sounds.  No palpable organomegaly was n

oted.  There is no palpable pulsatile mass.





SKIN:


Skin is clear with no lesions or rashes and otherwise unremarkable.





NEUROLOGIC:


Patient is alert and oriented x3.  Cranial nerves II through XII are grossly 

intact.  Motor and sensory are also intact.  Normal speech, volume and content. 

 Symmetrical smile. 





MUSCULOSKELETAL:


Normal extremities with adequate strength and full range of motion.  2+ edema 

bilaterally





LYMPHATICS:


No significant lymphadenopathy is noted





PSYCHIATRIC:


Normal psychiatric evaluation.  


Limitations: no limitations





Course


                                   Vital Signs











  08/25/19 08/25/19





  10:41 11:01


 


Temperature 97.5 F L 


 


Pulse Rate 117 H 99


 


Respiratory 20 18





Rate  


 


Blood Pressure 153/76 149/94


 


O2 Sat by Pulse 96 95





Oximetry  














Medical Decision Making





- Medical Decision Making





EKG shows a sinus rhythm with occasional PACs.  Patient's rate is 90 beats a 

minute IL interval appears to be 180.  QRS is 96 QT interval 380 QTC is 474.





Chest x-ray shows large right-sided pleural effusion with some atelectasis in 

the left base.





I spoke with Dr. Betts but remained patient admitted the patient wrote 

admitting orders.  I gave the patient magnesium 2 g in the emergency department.

  Patient also received a small dose of Lasix because of pedal edema.  I consult

 to Dr. Roberto.





- Lab Data


Result diagrams: 


                                 08/25/19 11:13





                                 08/25/19 11:13


                                   Lab Results











  08/25/19 08/25/19 08/25/19 Range/Units





  11:13 11:13 11:13 


 


WBC  9.3    (3.8-10.6)  k/uL


 


RBC  2.83 L    (4.30-5.90)  m/uL


 


Hgb  8.4 L    (13.0-17.5)  gm/dL


 


Hct  24.6 L    (39.0-53.0)  %


 


MCV  86.9    (80.0-100.0)  fL


 


MCH  29.6    (25.0-35.0)  pg


 


MCHC  34.1    (31.0-37.0)  g/dL


 


RDW  14.6    (11.5-15.5)  %


 


Plt Count  377    (150-450)  k/uL


 


Neutrophils %  82    %


 


Lymphocytes %  11    %


 


Monocytes %  4    %


 


Eosinophils %  1    %


 


Basophils %  0    %


 


Neutrophils #  7.6    (1.3-7.7)  k/uL


 


Lymphocytes #  1.1    (1.0-4.8)  k/uL


 


Monocytes #  0.4    (0-1.0)  k/uL


 


Eosinophils #  0.1    (0-0.7)  k/uL


 


Basophils #  0.0    (0-0.2)  k/uL


 


PT     (9.0-12.0)  sec


 


INR     (<1.2)  


 


APTT     (22.0-30.0)  sec


 


Sodium   124 L   (137-145)  mmol/L


 


Potassium   3.5   (3.5-5.1)  mmol/L


 


Chloride   88 L   ()  mmol/L


 


Carbon Dioxide   22   (22-30)  mmol/L


 


Anion Gap   14   mmol/L


 


BUN   19   (9-20)  mg/dL


 


Creatinine   0.97   (0.66-1.25)  mg/dL


 


Est GFR (CKD-EPI)AfAm   82   (>60 ml/min/1.73 sqM)  


 


Est GFR (CKD-EPI)NonAf   71   (>60 ml/min/1.73 sqM)  


 


Glucose   137 H   (74-99)  mg/dL


 


Plasma Lactic Acid Leeroy    1.3  (0.7-2.0)  mmol/L


 


Calcium   7.2 L   (8.4-10.2)  mg/dL


 


Magnesium   0.5 L*   (1.6-2.3)  mg/dL


 


Total Bilirubin   0.6   (0.2-1.3)  mg/dL


 


AST   29   (17-59)  U/L


 


ALT   32   (21-72)  U/L


 


Alkaline Phosphatase   71   ()  U/L


 


Troponin I     (0.000-0.034)  ng/mL


 


NT-Pro-B Natriuret Pep     pg/mL


 


Total Protein   6.3   (6.3-8.2)  g/dL


 


Albumin   3.3 L   (3.5-5.0)  g/dL














  08/25/19 08/25/19 08/25/19 Range/Units





  11:13 11:13 11:13 


 


WBC     (3.8-10.6)  k/uL


 


RBC     (4.30-5.90)  m/uL


 


Hgb     (13.0-17.5)  gm/dL


 


Hct     (39.0-53.0)  %


 


MCV     (80.0-100.0)  fL


 


MCH     (25.0-35.0)  pg


 


MCHC     (31.0-37.0)  g/dL


 


RDW     (11.5-15.5)  %


 


Plt Count     (150-450)  k/uL


 


Neutrophils %     %


 


Lymphocytes %     %


 


Monocytes %     %


 


Eosinophils %     %


 


Basophils %     %


 


Neutrophils #     (1.3-7.7)  k/uL


 


Lymphocytes #     (1.0-4.8)  k/uL


 


Monocytes #     (0-1.0)  k/uL


 


Eosinophils #     (0-0.7)  k/uL


 


Basophils #     (0-0.2)  k/uL


 


PT   11.3   (9.0-12.0)  sec


 


INR   1.1   (<1.2)  


 


APTT   27.1   (22.0-30.0)  sec


 


Sodium     (137-145)  mmol/L


 


Potassium     (3.5-5.1)  mmol/L


 


Chloride     ()  mmol/L


 


Carbon Dioxide     (22-30)  mmol/L


 


Anion Gap     mmol/L


 


BUN     (9-20)  mg/dL


 


Creatinine     (0.66-1.25)  mg/dL


 


Est GFR (CKD-EPI)AfAm     (>60 ml/min/1.73 sqM)  


 


Est GFR (CKD-EPI)NonAf     (>60 ml/min/1.73 sqM)  


 


Glucose     (74-99)  mg/dL


 


Plasma Lactic Acid Leeroy     (0.7-2.0)  mmol/L


 


Calcium     (8.4-10.2)  mg/dL


 


Magnesium     (1.6-2.3)  mg/dL


 


Total Bilirubin     (0.2-1.3)  mg/dL


 


AST     (17-59)  U/L


 


ALT     (21-72)  U/L


 


Alkaline Phosphatase     ()  U/L


 


Troponin I    0.031  (0.000-0.034)  ng/mL


 


NT-Pro-B Natriuret Pep  2460    pg/mL


 


Total Protein     (6.3-8.2)  g/dL


 


Albumin     (3.5-5.0)  g/dL














Critical Care Time


Critical Care Time: Yes


Total Critical Care Time: 35





Disposition


Clinical Impression: 


 Hyponatremia, Hypomagnesemia, Pleural effusion, Dyspnea, Pedal edema





Disposition: ADMITTED AS IP TO THIS HOSP


Referrals: 


Fermin Jacob MD [Primary Care Provider] - 1-2 days


Time of Disposition: 12:36

## 2019-08-25 NOTE — P.HPIM
History of Present Illness


H&P Date: 08/25/19


Chief Complaint: weakness


Patient is an 87-year-old  male with a past medical history of 

recurrent pleural effusion since July 2019 requiring thoracentesis 3, prior l

aryngeal cancer status post chemo, radiation, and tracheostomy, and hypertension

as well as multiple other comorbid conditions who presented to the ER with 

weakness.  On arrival to the emergency department he underwent an extensive 

evaluation.  His initial heart rate was 117.  Laboratory analysis showed a 

hemoglobin of 8.4, sodium 124, glucose 137, magnesium 0.5, and albumin 3.3.  

Chest x-ray showed left basilar acute atelectasis or infiltrate as well as a 

moderate right-sided pleural effusion with no significant change.  He was 

started on magnesium and given 1 dose of Lasix in the emergency department.  He 

developed acute urinary retention requiring a Navarro catheter to be placed with 

return of 800 cc of urine. 





Patient seen and examined at bedside.  In the emergency department with wife 

present.  He developed weakness for 3 days.  This was sudden onset.  The last 

several months he has been struggling with worsening shortness of breath.  He 

has seen his PCP multiple times.  Dr. Jacob discovered a pleural effusion on the

right.  He initially had a thoracentesis done along with CAT scan on [facility].  He was then referred to Dr. Antonio who did not feel surgery was 

indicated at this point in time but referred him to Dr. Orr.  He underwent 1 

thoracentesis with Dr. Orr on 7/25 which was hemorrhagic in nature, he then 

underwent bronchoscopy on 8/16 pathology consistent with reactive cells but no 

signs of malignancy.  Had worsening shortness of breath and underwent 

thoracentesis with interventional radiology on 8/20.





He reports that since his bronchoscopy has had increasing clear phlegm 

production associated with a cough.  He denies any fevers or chills.  He reports

that his shortness of breath is worse with exertion.  He is no longer going able

to walk to the bathroom without significant exertion.  He started iron 

approximately one half weeks ago and started having loose frequent bowel 

movements.  He denies any abdominal pain nausea or vomiting.  His appetite has 

been intact.  His wife reports that he started having some lower extremity edema

one week ago.  She started some Lasix they had at home approximately 4 days ago 

due to this.  He also reports that he has been having increasing jerky movements

over the last 3-4 days.  He feels overall fatigued and tired and that his legs 

are heavy.  He denies any chest pain, palpitations, strokelike symptoms, or 

significant weight loss.








Review of Systems


Pertinent positives and negatives as discussed in HPI, a complete review of 

systems was performed and all other systems are negative.








Past Medical History


Past Medical History: Cancer, GERD/Reflux, Hyperlipidemia, Hypertension, 

Osteoarthritis (OA), Pulmonary Embolus (PE)


Additional Past Medical History / Comment(s): Laryngeal cancer RADIATION 

CHEMOTHERAPY completed.  Hypothyroidism.  BPH.  GERD.  Coronary artery disease. 

Anemia.  Gout.  Diverticulosis.


History of Any Multi-Drug Resistant Organisms: None Reported


Past Surgical History: Orthopedic Surgery


Additional Past Surgical History / Comment(s): RIGHT HIP REPLACEMENT.  trach.  

Tonsillectomy


Past Anesthesia/Blood Transfusion Reactions: No Reported Reaction


Additional Past Anesthesia/Blood Transfusion Reaction / Comment(s): HAD 2 UNITS 

DURING CHEMO AT [facility]


Past Psychological History: No Psychological Hx Reported


Smoking Status: Former smoker


Past Alcohol Use History: Daily


Past Drug Use History: None Reported


Additional History: Lives with his wife, Has been using walker and a wheelchiar 

for the last few days.





- Past Family History


  ** Father


Family Medical History: Cancer


Additional Family Medical History / Comment(s): KIDNEY CANCER





  ** Mother


Family Medical History: Myocardial Infarction (MI), Respiratory Disorder





Medications and Allergies


                                Home Medications











 Medication  Instructions  Recorded  Confirmed  Type


 


Multivitamin/Iron/Folic Acid 1 tab PO DAILY 02/12/18 08/25/19 History





[Centrum Complete Multivit Tab]    


 


Omeprazole [PriLOSEC] 20 mg PO DAILY 02/12/18 08/25/19 History


 


Tamsulosin HCl [Flomax] 0.4 mg PO HS 02/12/18 08/25/19 History


 


amLODIPine BESYLATE/BENAZEPRIL 1 cap PO DAILY 02/12/18 08/25/19 History





[amLODIPine BESYLATE/BENAZEPRIL    





10-40 mg]    


 


Thiamine [Vitamin B-1] 100 mg PO DAILY 08/13/19 08/25/19 History


 


Ferrous Sulfate [Feosol] 325 mg PO DAILY 08/15/19 08/25/19 History


 


Furosemide [Lasix] 20 mg PO DAILY 08/20/19 08/25/19 History


 


Potassium Chloride [Klor-Con 10] 10 meq PO DAILY 08/20/19 08/25/19 History


 


Acetaminophen/Diphenhydramine 1 tab PO HS 08/25/19 08/25/19 History





[Tylenol -25mg]    


 


Levothyroxine Sodium [Synthroid] 50 mcg PO DAILY 08/25/19 08/25/19 History








                                    Allergies











Allergy/AdvReac Type Severity Reaction Status Date / Time


 


No Known Allergies Allergy   Verified 08/25/19 10:49














Physical Exam


Osteopathic Statement: *.  No significant issues noted on an osteopathic 

structural exam other than those noted in the History and Physical/Consult.


Vitals: 


                                   Vital Signs











  Temp Pulse Resp BP Pulse Ox


 


 08/25/19 13:30   98  20  128/82  93 L


 


 08/25/19 13:00   93  22  114/87  91 L


 


 08/25/19 12:30   96  23  126/82  95


 


 08/25/19 12:00   93  22  127/87  95


 


 08/25/19 11:39   92  22  127/87  95


 


 08/25/19 11:01   99  18  149/94  95


 


 08/25/19 10:41  97.5 F L  117 H  20  153/76  96








                                Intake and Output











 08/24/19 08/25/19 08/25/19





 22:59 06:59 14:59


 


Output Total   650


 


Balance   -650


 


Output:   


 


  Urine   650


 


    Uretheral (Navarro)   650


 


Other:   


 


  Weight   77.111 kg











General: non toxic, no distress, appears at stated age, normal weight


Derm: scar midline anterior neck with indentation, no unusual rashes/lesions no 

unusual ecchymoses, warm, dry


Head: atraumatic, normocephalic, symmetric


Eyes: EOMI, no lid lag, anicteric sclera, pupils equal round reactive to light


ENT: Nose and ears atraumatic, no thrush,  no pharyngeal erythema


Neck: No thyromegaly, no cervical lymphadenopathy, trachea midline, supple


Mouth: no lip lesion, mucus membranes dry


Cardiovascular: S1S2 reg, no murmur, positive posterior tibial pulse bilateral, 

2+ edema, capillary refill less than 2 seconds


Lungs: absent breath sounds right base, no rhonchi, no rales , no accessory 

muscle use


Abdominal: soft,  nontender to palpation, no guarding, no appreciable org

anomegaly, normal bowel sounds


Ext: no gross muscle atrophy,  muscle strength 4 out of 5 in all 4 extremities 

grossly, no contractures, 


Neuro:  CN II-XI grossly intact, light touch intact all 4 extremities, finger to

 nose within normal limits, + tremor


Psych: Alert, oriented, appropriate affect 








Results


CBC & Chem 7: 


                                 08/25/19 11:13





                                 08/25/19 11:13


Labs: 


                  Abnormal Lab Results - Last 24 Hours (Table)











  08/25/19 08/25/19 08/25/19 Range/Units





  11:13 11:13 12:24 


 


RBC  2.83 L    (4.30-5.90)  m/uL


 


Hgb  8.4 L    (13.0-17.5)  gm/dL


 


Hct  24.6 L    (39.0-53.0)  %


 


Sodium   124 L   (137-145)  mmol/L


 


Chloride   88 L   ()  mmol/L


 


Glucose   137 H   (74-99)  mg/dL


 


Calcium   7.2 L   (8.4-10.2)  mg/dL


 


Magnesium   0.5 L*   (1.6-2.3)  mg/dL


 


Albumin   3.3 L   (3.5-5.0)  g/dL


 


Urine Protein    1+ H  (Negative)  











Chest x-ray: report reviewed





Thrombosis Risk Factor Assmnt





- DVT/VTE Prophylaxis


DVT/VTE Prophylaxis: Low risk, early ambulation encouraged





Assessment and Plan


Assessment: 


Recurrent right sided plerual effusion loculated


- NPO after midnight for possible pig tail cath, family wants to wait on IR 

consult until they speak with Dr. Antonio's team


- consult Dr. Orr 


- Lasix X 1


- O2 as needed


- consider repeat CT of chest 





Hypomagnesemia


- replace and recheck 





Hyponatremia 


- suspect due to fluid over load


- repeat level in 4 hours after lasix


- no FeNa due to recent home lasix use





Edema


- check echo


- Lasix


- strict I and O daily weight


- check liver ultrasound to r/o liver as cause of effusion





Hypothyroidism 


- resume home synthroid


- recent diagnosis





Acute urinary retention


- navarro


- flomax





Diarrhea


- stop oral iron


- observe from improvement 





Chronic: 


HTN, HLD, BPH, GERD





The patient is admitted with an anticipated greater than 2 midnight stay for 

evaluation of recurrent pleural effusion.


Surrogate decision-maker: wife


CODE STATUS: Full Code 


DVT prophylaxis: SCDs


Discussed with: patient, wife, ed physician


Anticipated discharge date: 2-3 days


Anticipated discharge place: home with home health


A total of 70 minutes was spent on the care of this complex patient more than 

50% of the time was spent in counseling and care coordination.





R

## 2019-08-26 LAB
ANION GAP SERPL CALC-SCNC: 12 MMOL/L
BUN SERPL-SCNC: 16 MG/DL (ref 9–20)
CALCIUM SPEC-MCNC: 7.3 MG/DL (ref 8.4–10.2)
CHLORIDE SERPL-SCNC: 89 MMOL/L (ref 98–107)
CO2 SERPL-SCNC: 23 MMOL/L (ref 22–30)
ERYTHROCYTE [DISTWIDTH] IN BLOOD BY AUTOMATED COUNT: 2.66 M/UL (ref 4.3–5.9)
ERYTHROCYTE [DISTWIDTH] IN BLOOD: 14.5 % (ref 11.5–15.5)
GLUCOSE SERPL-MCNC: 112 MG/DL (ref 74–99)
HCT VFR BLD AUTO: 23.5 % (ref 39–53)
HGB BLD-MCNC: 7.8 GM/DL (ref 13–17.5)
MAGNESIUM SPEC-SCNC: 1.8 MG/DL (ref 1.6–2.3)
MCH RBC QN AUTO: 29.2 PG (ref 25–35)
MCHC RBC AUTO-ENTMCNC: 33.1 G/DL (ref 31–37)
MCV RBC AUTO: 88.2 FL (ref 80–100)
PLATELET # BLD AUTO: 327 K/UL (ref 150–450)
POTASSIUM SERPL-SCNC: 3.9 MMOL/L (ref 3.5–5.1)
SODIUM SERPL-SCNC: 123 MMOL/L (ref 137–145)
SODIUM SERPL-SCNC: 124 MMOL/L (ref 137–145)
WBC # BLD AUTO: 9.1 K/UL (ref 3.8–10.6)

## 2019-08-26 RX ADMIN — Medication PRN MG: at 20:44

## 2019-08-26 RX ADMIN — POTASSIUM CHLORIDE SCH MEQ: 750 TABLET, EXTENDED RELEASE ORAL at 09:13

## 2019-08-26 RX ADMIN — CEFAZOLIN SCH MLS/HR: 330 INJECTION, POWDER, FOR SOLUTION INTRAMUSCULAR; INTRAVENOUS at 17:31

## 2019-08-26 RX ADMIN — HYDROCODONE BITARTRATE AND ACETAMINOPHEN PRN EACH: 5; 325 TABLET ORAL at 09:17

## 2019-08-26 RX ADMIN — LISINOPRIL SCH MG: 20 TABLET ORAL at 09:13

## 2019-08-26 RX ADMIN — Medication SCH MG: at 09:13

## 2019-08-26 RX ADMIN — THERA TABS SCH EACH: TAB at 09:13

## 2019-08-26 RX ADMIN — TAMSULOSIN HYDROCHLORIDE SCH MG: 0.4 CAPSULE ORAL at 19:53

## 2019-08-26 RX ADMIN — HYDROCODONE BITARTRATE AND ACETAMINOPHEN PRN EACH: 5; 325 TABLET ORAL at 03:44

## 2019-08-26 RX ADMIN — PANTOPRAZOLE SODIUM SCH MG: 40 TABLET, DELAYED RELEASE ORAL at 06:04

## 2019-08-26 RX ADMIN — LEVOTHYROXINE SODIUM SCH MCG: 50 TABLET ORAL at 06:04

## 2019-08-26 NOTE — CONS
CONSULTATION



This is a pulmonary/critical care consultation.



DATE OF CONSULTATION:

August 26, 2019



HISTORY OF PRESENT ILLNESS:

This is an 87-year-old male with a history of recurrent right-sided pleural effusions.

The patient has had thoracentesis performed 3 times.  Initially it was done over at

[facility].  I believe by one of the interventional radiologist.

Subsequent to that, I did a thoracentesis on him and following that, more recently, he

had one done by Dr. Simon Triana here at [facility].  Anyway, the patient was

initially sent to thoracic surgery for consideration of a PleurX catheter.  Initially,

they were reluctant to do it.  Subsequent to that, I saw the patient and put him

through a 2nd thoracentesis and then also bronchoscopy, airway examination, BAL brushes

and washes of that right lower lobe.  In fact, it is interesting that on all of the

thoracenteses, and also the bronchoscopy, everything has come back negative.

Nonetheless, he apparently has recurrent effusion.  The effusions are exudative in

nature.  They do have high LDH and high-protein amounts.  Microbiology though and

cytology have been negative as was all the sampling via bronchoscopy.  The patient

apparently came in to the hospital for weakness and just not being able to stand up on

his legs.  Not anything more than that.  No fever, chills.  No cough.  No phlegm

production.  No worsening of his breathing or anything like this.  The patient is

currently resting in bed.  Thoracic surgery apparently was to put a PleurX catheter in

him, but noted on his laboratory analysis on this admission that a sodium was low at

124 and one of the primary service along with plus or minus Nephrology, to consider

treating him and getting the sodium back up into the normal range.  From the pulmonary

standpoint, as I am seeing him, he is pretty much at baseline.  His breathing is not

any worse than it was when I saw him recently in the office.



HOME MEDICATIONS:

Include multivitamins, Prilosec, Flomax, amlodipine, thiamin, iron, Lasix, potassium

chloride, Tylenol p.m., and levothyroxine.



ALLERGIES:

Denied.



By the way his primary care provider is Dr. Jacob.



MEDICAL HISTORY:

Gastroesophageal reflux disease, hypertension, DJD, pulmonary embolism, throat cancer,

status post chemoradiation and a previous episode of tracheostomy and in the past.  In

addition, he does have a history of recurrent right-sided pleural effusions, which have

been exudate although sampling has been negative.



SURGICAL HISTORY:

Includes multiple thoracentesis x3, bronchoscopy, airway examination, therapeutic

lavage, BAL, brushes and biopsies, right hip replacement, previous tracheostomy and

some other minor procedures.



SOCIAL HISTORY:

Positive for previous tobacco use.  He drinks alcohol daily.  No illicit drug use.



FAMILY HISTORY:

Positive for father with kidney cancer and mother who had myocardial infarction.



REVIEW OF SYSTEMS:

CONSTITUTIONAL:  Weakness, unsteadiness on his legs.

NEUROLOGIC: Negative.

HEENT negative.

CARDIOVASCULAR negative.

PULMONARY:  Right-sided pleural effusion, recurrent.

GI negative.

 negative.

RHEUMATOLOGIC negative.

IMMUNOLOGIC negative.

ENDOCRINOLOGIC negative.

DERMATOLOGIC negative.



PHYSICAL EXAMINATION:

VITAL SIGNS: Current vital signs include temperature 98, heart rate 79, respiratory

rate 20, blood pressure 122/72 and on 2 L saturation 94%.  Appears in no acute

distress.  Not particularly tachypneic.  Sitting up in his chair with the oxygen in

place.  Wife next to him in no distress.

HEENT examination is grossly unremarkable.  Mucous membranes are moist.  Nasal O2

noted.

NECK:  Supple.  Full range of motion.  No adenopathy or thyromegaly.  Neck veins are

flat.

CARDIOVASCULAR examination reveals regular rhythm and rate.  Heart rate 79.  S1, S2

normal.  No S3, S4, or murmur.

LUNGS:  Diminished breath sounds at the right base.  There is also some crackles at

both bases.  No wheezes.  Breath sounds equal bilaterally.

ABDOMEN:  Soft.  Bowel sounds are heard.

EXTREMITIES are intact.  No cyanosis, clubbing, or edema.

SKIN: Without rash.

NEUROLOGIC examination is brief but nonfocal.



LABS:

Reviewed.  White count 9.1, hemoglobin 7.8, hematocrit 23.5, platelet count 327,000.

PT/INR, PTT normal.  Sodium 124, potassium 3.9, chloride 89, CO2 is 23. BUN and

creatinine were 16 and 0.91.  Magnesium 0.5 and then subsequent one is  1.9.  Repeat

1.8.  The rest of his labs look okay.  N-terminal proBNP 2460.  Albumin 3.3.  Troponin

0.031.  Urine was negative.



Medications are reviewed.



Chest x-ray shows primarily right-sided pleural effusion, looks maybe slightly larger

than it was before.



ASSESSMENT:

1. Recurrent right-sided pleural effusion, status post thoracentesis x3 with negative

    cytology and microbiology, and status post bronchoscopy with airway examination,

    therapeutic lavage, BAL, brushes and biopsies of that right lower lobe, all of

    which have also turned out to be negative.

2. Hyponatremia, currently being evaluated by the primary service and by Nephrology.

3. Anticipated right-sided PleurX catheter to be placed by thoracic surgery.

4. History of laryngeal carcinoma, status post chemoradiation.

5. Gastroesophageal reflux disease.

6. Hypertension.

7. Degenerative joint disease.

8. History of pulmonary embolism.

9. Prior history of tracheostomy.



PLAN:

The patient apparently will be seen by Nephrology.  They will deal with hyponatremia.

From the pulmonary standpoint, the patient is stable.  He will await correction of the

sodium before PleurX catheter is placed.  No additional recommendations are made from

my perspective.  Medications are reviewed.  Additional recommendations and suggestions

are forthcoming.





MMODL / IJN: [id number] / Job#: [id number]

## 2019-08-26 NOTE — P.GSCN
History of Present Illness


Consult date: 08/26/19


Reason for Consult: 





Recurrent right pleural effusion, evaluate for Pleurx catheter placement.


Requesting physician: Holly Betts


History of present illness: 





This is an 87-year-old gentleman who is followed by Dr. Jacob on an outpatient 

basis.  The patient has a past medical history significant for recurrent right 

pleural effusions which started in July 2019 and he has undergone 3 

thoracentesis the last of which was performed about a week ago.  He also has a 

past medical history significant for vocal cord cancer, status post 4 treatments

of chemotherapy and 38 treatments of radiation which was completed in December 2017, tracheostomy, hypothyroidism, BPH, hyperlipidemia, osteoarthritis history 

of pulmonary embolus and hypertension.  The patient presented to the emergency 

room here at [facility] due to some complaints of progressive 

shortness of breath, generalized weakness, diarrhea, urinary retention and lack 

of appetite.  He denies any complaints of recent fever, chills, cough, nausea, 

vomiting or pain.  Subsequently, a chest x-ray was completed in the emergency 

department which showed mild cardiomegaly with patchy left basilar acute 

atelectasis and/or infiltrate and a stable moderate sized right pleural 

effusion.  A 12-lead EKG was completed which showed normal sinus rhythm with 

occasional PACs with a heart rate of 90 BPM.  The patient also had some 

laboratory studies completed on admission which showed a WBC count of 9.3, 

hemoglobin 8.4, hematocrit 24.6, sodium of 124, chloride 88, glucose 137 and 

magnesium 0.5.  Due to the patient's presenting symptoms, chest x-ray findings 

and laboratory results the patient was admitted for further evaluation and 

treatment.  Due to the patient's recurrent right pleural effusion a consult was 

placed to Dr. Curtis Herrera for possible Pleurx catheter placement.  





Review of Systems





A 14 point review of systems was completed and was negative except as mentioned 

in the HPI.





Past Medical History


Past Medical History: Cancer, GERD/Reflux, Hyperlipidemia, Hypertension, 

Osteoarthritis (OA), Pulmonary Embolus (PE), Thyroid Disorder


Additional Past Medical History / Comment(s): Laryngeal cancer RADIATION 

CHEMOTHERAPY completed.  Hypothyroidism.  BPH.  GERD.  Coronary artery disease. 

Anemia.  Gout.  Diverticulosis.


History of Any Multi-Drug Resistant Organisms: None Reported


Past Surgical History: Orthopedic Surgery, Tonsillectomy


Additional Past Surgical History / Comment(s): RIGHT HIP REPLACEMENT.  trach


Past Anesthesia/Blood Transfusion Reactions: No Reported Reaction


Additional Past Anesthesia/Blood Transfusion Reaction / Comm: HAD 2 UNITS DURING

CHEMO AT [facility]


Past Psychological History: No Psychological Hx Reported


Smoking Status: Former smoker (Stopped smoking about 30 years ago.)


Past Alcohol Use History: Daily (Drinks at least 2 glasses of water and whiskey 

daily.)


Past Drug Use History: None Reported





- Past Family History


  ** Father


Family Medical History: Cancer


Additional Family Medical History / Comment(s): KIDNEY CANCER





  ** Mother


Family Medical History: Myocardial Infarction (MI), Respiratory Disorder





Medications and Allergies


                                Home Medications











 Medication  Instructions  Recorded  Confirmed  Type


 


Multivitamin/Iron/Folic Acid 1 tab PO DAILY 02/12/18 08/25/19 History





[Centrum Complete Multivit Tab]    


 


Omeprazole [PriLOSEC] 20 mg PO DAILY 02/12/18 08/25/19 History


 


Tamsulosin HCl [Flomax] 0.4 mg PO HS 02/12/18 08/25/19 History


 


amLODIPine BESYLATE/BENAZEPRIL 1 cap PO DAILY 02/12/18 08/25/19 History





[amLODIPine BESYLATE/BENAZEPRIL    





10-40 mg]    


 


Thiamine [Vitamin B-1] 100 mg PO DAILY 08/13/19 08/25/19 History


 


Ferrous Sulfate [Feosol] 325 mg PO DAILY 08/15/19 08/25/19 History


 


Furosemide [Lasix] 20 mg PO DAILY 08/20/19 08/25/19 History


 


Potassium Chloride [Klor-Con 10] 10 meq PO DAILY 08/20/19 08/25/19 History


 


Acetaminophen/Diphenhydramine 1 tab PO HS 08/25/19 08/25/19 History





[Tylenol -25mg]    


 


Levothyroxine Sodium [Synthroid] 50 mcg PO DAILY 08/25/19 08/25/19 History








                                    Allergies











Allergy/AdvReac Type Severity Reaction Status Date / Time


 


No Known Allergies Allergy   Verified 08/25/19 10:49














Surgical - Exam


                                   Vital Signs











Temp Pulse Resp BP Pulse Ox


 


 97.5 F L  117 H  20   153/76   96 


 


 08/25/19 10:41  08/25/19 10:41  08/25/19 10:41  08/25/19 10:41  08/25/19 10:41














- General


well developed, well nourished, no distress, no pain





- Eyes


PERRL, normal ocular movement





- ENT


normal pinna, normal nares, normal mucosa, no hearing loss, no congestion, poor 

nursing home, dentures





- Neck





Neck is supple, no lymphadenopathy, midline tracheostomy stoma without redness.


no masses, no bruits, no venous distension





- Respiratory





Lung sounds with absent breath sounds to right lower lobe, essentially clear to 

his left lobes.  No rhonchi wheezes or crackles present.  Respirations are symm

etrical and nonlabored.





- Cardiovascular





Regular rhythm and rate.  S1 and S2 present, negative for S3, gallop or murmur. 

 +2 edema to his bilateral lower extremities.





- Abdomen





Abdomen soft, nontender and nondistended.  Active bowel sounds present all 4 

abdominal quadrants.  No guarding or rigidity.  No organomegaly appreciated.





- Genitourinary





Deferred





- Rectum





Deferred





- Integumentary


no rash, no growths, no abnormal pigmentation





- Neurologic


normal coordination, normal sensation





- Musculoskeletal





Generalized weakness


normal gait, normal posture





- Psychiatric


oriented to time, oriented to person, oriented to place, speech is normal, 

memory intact





Results





- Labs





                                 08/26/19 06:16





                                 08/26/19 15:43


                  Abnormal Lab Results - Last 24 Hours (Table)











  08/25/19 08/26/19 08/26/19 Range/Units





  19:34 06:16 06:16 


 


RBC   2.66 L   (4.30-5.90)  m/uL


 


Hgb   7.8 L   (13.0-17.5)  gm/dL


 


Hct   23.5 L   (39.0-53.0)  %


 


Sodium  123 L   124 L  (137-145)  mmol/L


 


Potassium  3.2 L    (3.5-5.1)  mmol/L


 


Chloride  88 L   89 L  ()  mmol/L


 


Glucose  155 H   112 H  (74-99)  mg/dL


 


Calcium  7.2 L   7.3 L  (8.4-10.2)  mg/dL


 


Magnesium  0.9 L*    (1.6-2.3)  mg/dL


 


TSH     (0.465-4.680)  mIU/L














  08/26/19 Range/Units





  15:43 


 


RBC   (4.30-5.90)  m/uL


 


Hgb   (13.0-17.5)  gm/dL


 


Hct   (39.0-53.0)  %


 


Sodium  123 L  (137-145)  mmol/L


 


Potassium   (3.5-5.1)  mmol/L


 


Chloride   ()  mmol/L


 


Glucose   (74-99)  mg/dL


 


Calcium   (8.4-10.2)  mg/dL


 


Magnesium   (1.6-2.3)  mg/dL


 


TSH  4.950 H  (0.465-4.680)  mIU/L








                                 Diabetes panel











  08/25/19 08/26/19 08/26/19 Range/Units





  19:34 06:16 15:43 


 


Sodium  123 L  124 L  123 L  (137-145)  mmol/L


 


Potassium  3.2 L  3.9   (3.5-5.1)  mmol/L


 


Chloride  88 L  89 L   ()  mmol/L


 


Carbon Dioxide  24  23   (22-30)  mmol/L


 


BUN  17  16   (9-20)  mg/dL


 


Creatinine  0.91  0.91   (0.66-1.25)  mg/dL


 


Glucose  155 H  112 H   (74-99)  mg/dL


 


Calcium  7.2 L  7.3 L   (8.4-10.2)  mg/dL








                                  Thyroid panel











  08/26/19 Range/Units





  15:43 


 


TSH  4.950 H  (0.465-4.680)  mIU/L








                                  Calcium panel











  08/25/19 08/26/19 Range/Units





  19:34 06:16 


 


Calcium  7.2 L  7.3 L  (8.4-10.2)  mg/dL


 


Phosphorus   3.2  (2.5-4.5)  mg/dL








                                 Pituitary panel











  08/25/19 08/26/19 08/26/19 Range/Units





  19:34 06:16 15:43 


 


Sodium  123 L  124 L  123 L  (137-145)  mmol/L


 


Potassium  3.2 L  3.9   (3.5-5.1)  mmol/L


 


Chloride  88 L  89 L   ()  mmol/L


 


Carbon Dioxide  24  23   (22-30)  mmol/L


 


BUN  17  16   (9-20)  mg/dL


 


Creatinine  0.91  0.91   (0.66-1.25)  mg/dL


 


Glucose  155 H  112 H   (74-99)  mg/dL


 


Calcium  7.2 L  7.3 L   (8.4-10.2)  mg/dL


 


TSH    4.950 H  (0.465-4.680)  mIU/L








                                  Adrenal panel











  08/25/19 08/26/19 08/26/19 Range/Units





  19:34 06:16 15:43 


 


Sodium  123 L  124 L  123 L  (137-145)  mmol/L


 


Potassium  3.2 L  3.9   (3.5-5.1)  mmol/L


 


Chloride  88 L  89 L   ()  mmol/L


 


Carbon Dioxide  24  23   (22-30)  mmol/L


 


BUN  17  16   (9-20)  mg/dL


 


Creatinine  0.91  0.91   (0.66-1.25)  mg/dL


 


Glucose  155 H  112 H   (74-99)  mg/dL


 


Calcium  7.2 L  7.3 L   (8.4-10.2)  mg/dL














- Imaging


Chest x-ray: report reviewed, image reviewed


EKG: image reviewed





Assessment and Plan


Assessment: 





1.  Recurrent right-sided pleural effusion


2.  Hyponatremia with an admission sodium level of 124


3.  Hypomagnesemia with an admission magnesium level of 0.5


4.  History of hypertension


5.  Hypothyroidism


6.  Acute urinary retention, requiring Waterman catheter insertion


7.  Diarrhea


8.  History of benign prostatic hypertrophy


9.  History of gastroesophageal reflux disease


10.  History of vocal cord cancer, status post chemotherapy and radiation


Plan: 





The patient was seen and examined.  His chart and diagnostics were reviewed by 

Dr. Herrera.  The patient was evaluated for placement of Pleurx catheter 

although due to the patient's hyponatremia and hypo-magnesia it was felt that he

 should be optimized medically before proceeding with a Pleurx catheter 

placement.  If the patient continues to become more short of breath he may 

benefit from a right thoracentesis.  Consult nephrology for management of his 

hypernatremia and hypo-magnesia.  Once his sodium and magnesium have normalized 

he will be considered for a Pleurx catheter placement.  The above-mentioned plan

 has been discussed with the patient by Dr. Herrera and has also been discussed 

with Dr. Calvo from internal medicine. Dr. Herrera has also discussed the Pleurx

 catheter placement and plan with Dr. Arreola from anesthesia who is also in 

agreement to postpone a Pleurx catheter placement until the patient has been me

dically optimized.  The patient may be taken off his nothing by mouth status per

 the cardiothoracic surgery standpoint.





Thank you Dr. Betts for this consult and we will look forward to working with 

failure of care per patient.


Time with Patient: Greater than 30

## 2019-08-26 NOTE — FL
EXAMINATION TYPE: FL barium swallow w video

 

DATE OF EXAM: 8/26/2019

 

MODIFIED SWALLOW / DEGLUTITION STUDY

 

CLINICAL HISTORY: Dysphagia.

 

TECHNIQUE:  Deglutition study is performed utilizing thin liquid barium, honey and nectar thick liqui
d barium, barium thick applesauce, and barium coated cracker. 1 minute and 49 seconds of fluoroscopy 
time was utilized during the examination with 0 images saved as the examination was video recorded.

 

COMPARISON: None.

 

FINDINGS: The oral and pharyngeal phases show satisfactory initiation and propagation with all modali
ties tested.  Normal mastication is seen with solid modalities tested.  There is no evidence of penet
ration or aspiration with any modality tested. Moderate vallecular retention.

 

IMPRESSION: No evidence of laryngeal penetration or aspiration. Moderate vallecular retention.  Pleas
e refer to speech therapist notes for further details if necessary.

## 2019-08-26 NOTE — P.PN
Subjective


Progress Note Date: 08/26/19


Patient is an 87-year-old  male with a past medical history of 

recurrent pleural effusion since July 2019 requiring thoracentesis 3, prior 

laryngeal cancer status post surgery, chemo and radiation, hypertension as well 

as multiple other comorbid conditions who presented to the ER with weakness.  

His initial heart rate was 117, hemoglobin of 8.4, sodium 124, glucose 137, 

magnesium 0.5, and albumin 3.3.  Chest x-ray showed left basilar acute 

atelectasis or infiltrate as well as a moderate right-sided pleural effusion 

with no significant change.  Magnesium was replaced, he developed acute urinary 

retention requiring a Waterman catheter to be placed with return of 800 cc of 

urine. He reported that he was struggling with worsening shortness of breath for

several months.  





His PCP Dr. Jacob discovered a pleural effusion on the right.  He initially had 

a thoracentesis done along with CAT scan on [facility].  He was 

then referred to Dr. Antonio who did not feel surgery was indicated at this point 

in time but referred him to Dr. Orr.  He underwent 1 thoracentesis with Dr. Orr on 7/25 which was hemorrhagic in nature, he then underwent bronchoscopy on

8/16 pathology consistent with reactive cells but no signs of malignancy.  Had 

worsening shortness of breath and underwent thoracentesis with interventional 

radiology on 8/20. His wife reports that he started having some lower extremity 

edema one week ago.  She started some Lasix they had at home approximately 4 

days ago due to this. 





Today patient reports that he is breathing slightly better but gets very short 

of breath with minimal exertion.  He denies chest pain, palpitation, 

diaphoresis, nausea, vomiting, diarrhea and denies rest of the review system.  

Patient did complain of having generalized fatigue and weakness.  Patient was 

supposed to have the Pleurx catheter placed by cardiothoracic's today but Juan Anne called that anesthesiologist wants hyponatremia to be addressed first prior 

to placement of Pleurx catheter.  Speech therapist requested swallow evaluation 

again as it was noted that patient has some gurgling sound after eating meals.  

No other issues were brought up by the patient or the patient's wife or the 

nurse.  Patient magnesium was low at the time of admission but it was replaced 

and it was 1.8 today.








Objective





- Vital Signs


Vital signs: 


                                   Vital Signs











Temp  98 F   08/26/19 11:32


 


Pulse  79   08/26/19 11:32


 


Resp  20   08/26/19 11:32


 


BP  122/72   08/26/19 11:32


 


Pulse Ox  91 L  08/26/19 11:32








                                 Intake & Output











 08/25/19 08/26/19 08/26/19





 18:59 06:59 18:59


 


Output Total 650 550 


 


Balance -650 -550 


 


Weight 74.5 kg 76 kg 


 


Output:   


 


  Urine 650 550 


 


    Uretheral (Waterman) 650  


 


Other:   


 


  Voiding Method Indwelling Catheter Indwelling Catheter Indwelling Catheter














- Constitutional


General appearance: Present: cooperative, mild distress (Respiratory)





- EENT


Eyes: Present: EOMI, normal appearance


ENT: Present: hearing grossly normal, NA/AT





- Neck


Details: 





+ hoarse voice.


Neck: Present: normal ROM.  Absent: lymphadenopathy, rigidity, thyromegaly





- Respiratory


Respiratory: right: CTA, left: diminished (Lower half of right chest.), dullness

(Lower half of right chest.), negative: rales, rhonchi, wheezing





- Cardiovascular


Rhythm: regular


Heart sounds: normal: S1, S2


Abnormal Heart Sounds: Absent: systolic murmur, diastolic murmur, S3 Gallop, S4 

Gallop





- Gastrointestinal


General gastrointestinal: Present: normal bowel sounds, soft.  Absent: d

istended, rigid, tenderness





- Neurologic


Neurologic: Present: CNII-XII intact.  Absent: focal deficits





- Psychiatric


Psychiatric: Present: A&O x's 3, appropriate affect, intact judgment & insight





- Allied health notes


Allied health notes reviewed: nursing





- Labs


CBC & Chem 7: 


                                 08/26/19 06:16





                                 08/26/19 06:16


Labs: 


                  Abnormal Lab Results - Last 24 Hours (Table)











  08/25/19 08/26/19 08/26/19 Range/Units





  19:34 06:16 06:16 


 


RBC   2.66 L   (4.30-5.90)  m/uL


 


Hgb   7.8 L   (13.0-17.5)  gm/dL


 


Hct   23.5 L   (39.0-53.0)  %


 


Sodium  123 L   124 L  (137-145)  mmol/L


 


Potassium  3.2 L    (3.5-5.1)  mmol/L


 


Chloride  88 L   89 L  ()  mmol/L


 


Glucose  155 H   112 H  (74-99)  mg/dL


 


Calcium  7.2 L   7.3 L  (8.4-10.2)  mg/dL


 


Magnesium  0.9 L*    (1.6-2.3)  mg/dL














Assessment and Plan


(1) Dyspnea


Current Visit: Yes   Status: Acute   Priority: High   Code(s): R06.00 - DYSPNEA,

UNSPECIFIED   SNOMED Code(s): 626457474


   





(2) Pleural effusion


Current Visit: Yes   Status: Acute   Priority: High   Code(s): J90 - PLEURAL 

EFFUSION, NOT ELSEWHERE CLASSIFIED   SNOMED Code(s): 22395843


   





(3) Hyponatremia


Current Visit: Yes   Status: Acute   Priority: High   Code(s): E87.1 - HYPO-

OSMOLALITY AND HYPONATREMIA   SNOMED Code(s): 86558855


   





(4) Hypomagnesemia


Current Visit: Yes   Status: Acute   Priority: Medium   Code(s): E83.42 - 

HYPOMAGNESEMIA   SNOMED Code(s): 881735540


   





(5) Acute urinary retention


Current Visit: Yes   Status: Acute   Priority: High   Code(s): R33.8 - OTHER 

RETENTION OF URINE   SNOMED Code(s): 921730161


   





(6) Laryngeal edema


Current Visit: Yes   Status: Acute   Priority: Medium   Code(s): J38.4 - EDEMA 

OF LARYNX   SNOMED Code(s): 49434858


   


Plan: 


Patient current management will be continued.  Patient hemoglobin will be 

monitored.  He will be referred to nephrology for evaluation and management of 

hyponatremia.  I will await their recommendations.  Patient will be referred for

a sore evaluation for possible aspiration risk assessment.  Rest of the 

medication for current acute medical problems and chronic stable conditions will

be continued as it is.  Physical therapy will be continued to give 

rehabilitation to the patient.  We'll continue to monitor while patient is in 

the hospital.  Wife was updated per patient's verbal consent and all questions 

were answered.





Time with Patient: Less than 30

## 2019-08-27 LAB
ANION GAP SERPL CALC-SCNC: 11 MMOL/L
BUN SERPL-SCNC: 18 MG/DL (ref 9–20)
CALCIUM SPEC-MCNC: 7.8 MG/DL (ref 8.4–10.2)
CHLORIDE SERPL-SCNC: 90 MMOL/L (ref 98–107)
CO2 SERPL-SCNC: 24 MMOL/L (ref 22–30)
GLUCOSE SERPL-MCNC: 127 MG/DL (ref 74–99)
POTASSIUM SERPL-SCNC: 4 MMOL/L (ref 3.5–5.1)
SODIUM SERPL-SCNC: 125 MMOL/L (ref 137–145)

## 2019-08-27 RX ADMIN — LISINOPRIL SCH MG: 20 TABLET ORAL at 09:26

## 2019-08-27 RX ADMIN — ACETAMINOPHEN PRN MG: 325 TABLET, FILM COATED ORAL at 14:58

## 2019-08-27 RX ADMIN — LEVOTHYROXINE SODIUM SCH MCG: 50 TABLET ORAL at 06:19

## 2019-08-27 RX ADMIN — Medication SCH MG: at 09:27

## 2019-08-27 RX ADMIN — POTASSIUM CHLORIDE SCH MEQ: 750 TABLET, EXTENDED RELEASE ORAL at 09:27

## 2019-08-27 RX ADMIN — TAMSULOSIN HYDROCHLORIDE SCH MG: 0.4 CAPSULE ORAL at 20:19

## 2019-08-27 RX ADMIN — PANTOPRAZOLE SODIUM SCH MG: 40 TABLET, DELAYED RELEASE ORAL at 06:19

## 2019-08-27 RX ADMIN — THERA TABS SCH EACH: TAB at 09:23

## 2019-08-27 RX ADMIN — CEFAZOLIN SCH MLS/HR: 330 INJECTION, POWDER, FOR SOLUTION INTRAMUSCULAR; INTRAVENOUS at 13:32

## 2019-08-27 NOTE — CONS
CONSULTATION



REASON FOR CONSULT:

Hyponatremia.



HISTORY OF PRESENT ILLNESS:

Patient is an 87-year-old male who was admitted to the hospital on August 25 with

complaints of weakness.  The patient has an underlying history of laryngeal cancer,

status post chemotherapy and radiation therapy, and hypertension.  The patient did

admit to a previous history of hyponatremia.  Although we do not have any documented

low sodium levels.  Serum sodium was 124 on initial admission, it dropped down to 123.

Patient is currently maintained on normal saline and received 2 doses of IV Lasix.  His

serum sodium is up to 125.  Urine osmolality was 345 and urine sodium was 74.  Blood

pressure is not low.  The patient is maintained on oral Lasix at home.  He denies use

of any thiazide diuretics.



PAST MEDICAL HISTORY:

Laryngeal cancer, status post radiation therapy, chemotherapy, hypothyroidism, BPH,

urinary artery disease, history of gout, diverticulosis, osteoarthritis, history of PE.



PAST SURGICAL HISTORY:

Right hip arthroplasty, tracheostomy, tonsillectomy.



SOCIAL HISTORY:

Patient is a former smoker.  No history of drug abuse.



MEDICATIONS PRIOR TO ADMISSION:

Included Centrum, Prilosec, Flomax, amlodipine, Benazepril, iron, Lasix, potassium,

Synthroid.



ALLERGIES:

None.



REVIEW OF SYSTEMS:

As per HPI.  Other systems negative.



Patient denies any history of diarrhea, nausea, vomiting.



PHYSICAL EXAMINATION:

On examination, blood pressure this morning was 130/58, heart rate 84 per minute.

Patient is afebrile.  Examination of the heart S1, S2.  Examination of the lungs,

bilateral breath sounds are heard.  Abdomen is soft, nontender.  Exam of lower

extremities shows trace edema bilaterally.  CNS exam grossly intact.



LABS:

Sodium of 125, potassium 4.0, BUN 18, serum creatinine 0.9.  Urine sodium 74, urine

osmolality 345.  Chest x-ray

from today shows cardiomegaly with left pleural effusion.  Moderate to large size right

pleural effusion is also noted.



ASSESSMENT:

Hyponatremia.  The patient appears euvolemic.  His serum sodium did not improve much

with normal saline.  However, I gave him a dose of IV Lasix with the saline and brought

it up to about 125.  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

antidiuretic hormone.  I will discontinue the saline and maintain patient on high

protein diet with a fluid restriction.  He will also receive a dose of tolvaptan.

Repeat labs in a.m.  TSH was slightly elevated with previous reading at 12.6 on

08/6/2019, suggesting definitely component of hypothyroidism as well contributing to

the hyponatremia.



Thank you for this consultation.  We will continue to follow the patient with you

during his hospitalization.





ISMA / SALENA: [id number] / Job#: [id number]

## 2019-08-27 NOTE — XR
EXAMINATION TYPE: XR chest 1V portable

 

DATE OF EXAM: 8/27/2019

 

CLINICAL HISTORY: Difficulty breathing and right-sided pleural effusion progress study.  

 

TECHNIQUE: Single AP portable upright view of the chest is obtained.

 

COMPARISON: Chest x-ray from 2 days earlier and older studies.

 

FINDINGS:  There is persistent moderate to large size right pleural effusion and associated right bas
ilar compressive atelectasis. There is tiny left pleural effusion and associated left basilar acute i
nfiltrate and/or atelectasis. Cardiac silhouette size is stable and enlarged with atherosclerotic aor
ta. Osseous structures are intact. There is partial visualization of contrast at level of splenic fle
xure from recent swallow study yesterday. Overlying EKG leads are redemonstrated.

 

IMPRESSION:  Overall stable findings,  cardiomegaly with tiny left pleural effusion and associated le
ft basilar atelectasis and/or infiltrate. Moderate to large size right pleural effusion and associate
d right basilar atelectasis and/or infiltrate.

## 2019-08-27 NOTE — P.PN
Subjective


Progress Note Date: 08/27/19


Principal diagnosis: 





Recurrent right pleural effusion, urinary retention, history of laryngeal cancer

status post chemotherapy and radiation, history of tracheostomy, hypothyroidism,

BPH, hyperlipidemia, osteoarthritis, history of pulmonary embolus and 

hypertension.





The patient is sitting on his bedside edge on the cardiac stepdown unit.  He is 

tolerating oral intake.  Denies any complaints of pain at this time although 

reports that his shortness of breath has not improved since his admission.  

Oxygen saturations are 94% on 3 L nasal cannula.  Laboratory results show a sod

ium of 125, chloride 90, BUN 18 and creatinine 0.99 this morning.  Dr. Hills 

met with the patient this morning and discussed the risks and benefits of 

placing a Pleurx catheter for his recurrent right pleural effusions.  The 

patient will be scheduled for a right Pleurx catheter placement tomorrow to be 

performed by Dr. Ortiz Hills.





Objective





- Vital Signs


Vital signs: 


                                   Vital Signs











Temp  97.0 F L  08/27/19 08:30


 


Pulse  79   08/27/19 08:30


 


Resp  18   08/27/19 08:30


 


BP  130/58   08/27/19 08:30


 


Pulse Ox  94 L  08/27/19 08:30








                                 Intake & Output











 08/26/19 08/27/19 08/27/19





 18:59 06:59 18:59


 


Intake Total 320  240


 


Output Total  655 


 


Balance 320 -655 240


 


Weight  77.3 kg 


 


Intake:   


 


  Intake, IV Titration 80  





  Amount   


 


    Dextrose 5% in Water 100 80  





    ml @ 35.333 mls/hr IV .   





    Q3H ONE with Magnesium   





    Sulfate 3,000 mg Rx#:   





    [id number]   


 


  Oral 240  240


 


Output:   


 


  Urine  655 


 


Other:   


 


  Voiding Method Indwelling Catheter Indwelling Catheter 














- Constitutional


General appearance: Present: average body habitus, cooperative, no acute 

distress





- Respiratory


Details: 





Lung sounds essentially clear to his left lobes, absent to his right lower lobe.

 Respirations are symmetrical and nonlabored.  Oxygen saturation are 94% on 3 L 

nasal cannula.  No rhonchi, wheezes or crackles present.





- Cardiovascular


Details: 





Regular rhythm and rate.  S1 and S2 present, negative for S3, gallop or murmur. 

+2 edema to his bilateral lower extremities. 





- Gastrointestinal


Gastrointestinal Comment(s): 





Abdomen soft, nontender and nondistended.  Active bowel sounds present all 4 

abdominal quadrants.  No guarding or rigidity.  No organomegaly appreciated.





- Genitourinary


Genitourinary Comment(s): 





Voiding clear yellow urine.  It very well





- Integumentary


Integumentary Comment(s): 





no rash, no growths, no abnormal pigmentation. Midline tracheostomy stoma 

without redness.





- Neurologic


Neurologic: Present: CNII-XII intact





- Musculoskeletal


Musculoskeletal: Present: gait normal, generalized weakness, strength equal 

bilaterally





- Psychiatric


Psychiatric: Present: A&O x's 3, appropriate affect, intact judgment & insight





- Allied health notes


Allied health notes reviewed: nursing





- Labs


CBC & Chem 7: 


                                 08/26/19 06:16





                                 08/27/19 10:54


Labs: 


                  Abnormal Lab Results - Last 24 Hours (Table)











  08/26/19 08/26/19 08/27/19 Range/Units





  15:43 21:24 03:49 


 


Sodium  123 L  123 L  125 L  (137-145)  mmol/L


 


Chloride    90 L  ()  mmol/L


 


Glucose    127 H  (74-99)  mg/dL


 


Calcium    7.8 L  (8.4-10.2)  mg/dL


 


TSH  4.950 H    (0.465-4.680)  mIU/L














  08/27/19 Range/Units





  10:54 


 


Sodium  125 L  (137-145)  mmol/L


 


Chloride   ()  mmol/L


 


Glucose   (74-99)  mg/dL


 


Calcium   (8.4-10.2)  mg/dL


 


TSH   (0.465-4.680)  mIU/L














- Imaging and Cardiology


Chest x-ray: report reviewed, image reviewed





Assessment and Plan


Assessment: 





1.  Recurrent right-sided pleural effusion


2.  Hyponatremia with an admission sodium level of 124


3.  Hypomagnesemia with an admission magnesium level of 0.5


4.  History of hypertension


5.  Hypothyroidism


6.  Acute urinary retention, requiring Waterman catheter insertion


7.  Diarrhea


8.  History of benign prostatic hypertrophy


9.  History of gastroesophageal reflux disease


10.  History of vocal cord cancer, status post chemotherapy and radiation


Plan: 





1.  We will place the patient on a 1500 mL in 24 hour fluid restriction for his 

hyponatremia.


2.  Nephrology consult pending.


3.  Nothing by mouth after midnight for right chest Pleurx catheter placement 

which is scheduled for 10 AM tomorrow Wednesday, 08/28/2019 to be performed by 

Dr. Ortiz Hills.


4.  More recommendations to follow based on patient's clinical course.


5.  Pleurx catheter teaching initiated.


6.  Medical management per primary care service recommendations


Time with Patient: Greater than 30

## 2019-08-27 NOTE — P.PN
Subjective


Progress Note Date: 08/27/19


Patient reports that he is feeling little better as compared to yesterday his 

breathing is better and his wife endorses to it.  Patient ambulated in the 

hallway the help of physical therapist, although he was short of breath as 

reported but he stated his shortness of breath has significantly improved along 

with his episodes of coughing.  Patient was referred to nephrology for 

hyponatremia/Hypomagnisemia  management which seems to be chronic in nature and 

their input awaited.  Patient continues to have some shortness of breath with 

the dyspnea on exertion along with some tachypnea off-and-on but it is improved 

as compared to the time of admission.  He denies chest pain, palpitation, 

headache, dizziness, nausea, vomiting, diarrhea, fever, chills and denies rest 

of the review of system.








Objective





- Vital Signs


Vital signs: 


                                   Vital Signs











Temp  97.9 F   08/27/19 12:25


 


Pulse  84   08/27/19 12:25


 


Resp  20   08/27/19 12:25


 


BP  138/64   08/27/19 12:25


 


Pulse Ox  96   08/27/19 12:25








                                 Intake & Output











 08/26/19 08/27/19 08/27/19





 18:59 06:59 18:59


 


Intake Total 320  480


 


Output Total  655 


 


Balance 320 -655 480


 


Weight  77.3 kg 


 


Intake:   


 


  Intake, IV Titration 80  





  Amount   


 


    Dextrose 5% in Water 100 80  





    ml @ 35.333 mls/hr IV .   





    Q3H ONE with Magnesium   





    Sulfate 3,000 mg Rx#:   





    [id number]   


 


  Oral 240  480


 


Output:   


 


  Urine  655 


 


Other:   


 


  Voiding Method Indwelling Catheter Indwelling Catheter Indwelling Catheter














- Constitutional


General appearance: Present: cooperative, mild distress (due to coughing.)





- EENT


Eyes: Present: EOMI, normal appearance


ENT: Present: hearing grossly normal, NA/AT





- Neck


Details: 





Healed scar from Tracheostomy 


Neck: Present: normal ROM.  Absent: lymphadenopathy, rigidity, stridor, 

thyromegaly





- Respiratory


Respiratory: left: CTA, bilateral: diminished (right lower lungs.), rales (Left 

basal, few.), negative: rhonchi, wheezing





- Cardiovascular


Rhythm: regular


Heart sounds: normal: S1, S2


Abnormal Heart Sounds: Absent: systolic murmur, diastolic murmur, S3 Gallop, S4 

Gallop





- Gastrointestinal


General gastrointestinal: Present: normal bowel sounds, soft.  Absent: 

distended, rigid, tenderness





- Neurologic


Neurologic: Present: CNII-XII intact.  Absent: focal deficits





- Psychiatric


Psychiatric: Present: A&O x's 3, appropriate affect





- Allied health notes


Allied health notes reviewed: nursing





- Labs


CBC & Chem 7: 


                                 08/26/19 06:16





                                 08/27/19 10:54


Labs: 


                  Abnormal Lab Results - Last 24 Hours (Table)











  08/26/19 08/26/19 08/27/19 Range/Units





  15:43 21:24 03:49 


 


Sodium  123 L  123 L  125 L  (137-145)  mmol/L


 


Chloride    90 L  ()  mmol/L


 


Glucose    127 H  (74-99)  mg/dL


 


Calcium    7.8 L  (8.4-10.2)  mg/dL


 


TSH  4.950 H    (0.465-4.680)  mIU/L














  08/27/19 Range/Units





  10:54 


 


Sodium  125 L  (137-145)  mmol/L


 


Chloride   ()  mmol/L


 


Glucose   (74-99)  mg/dL


 


Calcium   (8.4-10.2)  mg/dL


 


TSH   (0.465-4.680)  mIU/L














Assessment and Plan


(1) Dyspnea


Current Visit: Yes   Status: Acute   Priority: High   Code(s): R06.00 - DYSPNEA,

UNSPECIFIED   SNOMED Code(s): 327885571


   





(2) Pleural effusion


Current Visit: Yes   Status: Acute   Priority: High   Code(s): J90 - PLEURAL 

EFFUSION, NOT ELSEWHERE CLASSIFIED   SNOMED Code(s): 97494951


   





(3) Hyponatremia


Current Visit: Yes   Status: Acute   Priority: High   Code(s): E87.1 - HYPO-

OSMOLALITY AND HYPONATREMIA   SNOMED Code(s): 04808925


   





(4) Hypomagnesemia


Current Visit: Yes   Status: Acute   Priority: Medium   Code(s): E83.42 - 

HYPOMAGNESEMIA   SNOMED Code(s): 055095269


   





(5) Acute urinary retention


Current Visit: Yes   Status: Acute   Priority: High   Code(s): R33.8 - OTHER 

RETENTION OF URINE   SNOMED Code(s): 642872212


   





(6) Laryngeal edema


Current Visit: Yes   Status: Acute   Priority: Medium   Code(s): J38.4 - EDEMA 

OF LARYNX   SNOMED Code(s): 73520199


   


Plan: 


Patient was given isolated 2 doses of Lasix by nephrology and patient is feeling

better clinically I will do basic panel again in the morning along with lizbeth smith to assess I status and will continue current management.  Patient is 

tentatively scheduled for Pleurx catheter placement tomorrow pending final 

recommendations from nephrology services.  No changes in the current management 

is recommended at this time.  Patient will benefit from fluid restriction along 

with diuresis, which has been implemented.





Time with Patient: Less than 30

## 2019-08-27 NOTE — P.PN
Subjective


Progress Note Date: 08/27/19


Principal diagnosis: 





Recurrent right-sided pleural effusions.





This is a pleasant 87-year-old gentleman with a history of recurrent right-sided

pleural effusions.  Status post 3 previous thoracentesis.  The effusions are 

exudative in nature.  He had also undergone bronchoscopy with BAL.  He presented

here to the hospital again with complaints of increasing weakness and inability 

to stand.  He was found to be hyponatremic with a sodium of 124.  He is seen 

today in follow-up on the selective care unit.  Currently awake and alert in no 

acute distress.  Maintaining O2 saturations in the low 90s on 3 L/m per nasal 

cannula.  He's been afebrile.  Hemodynamically stable.  Today's chest x-ray 

shows overall stable findings.  There is evidence of cardiomegaly with tiny left

pleural effusion and associated left basilar atelectasis/infiltrate.  There is 

also moderate to large right-sided pleural effusion with atelectasis/infiltrate.

 The plan is for Pleurx catheter placement tomorrow by Dr. Hills.  Current 

sodium 125.  Potassium 4.0.  Chloride 90.  Bicarb 24.  Creatinine 0.99.  

Currently with a 0.9 normal saline at 50 MLS per hour.





Objective





- Vital Signs


Vital signs: 


                                   Vital Signs











Temp  97.0 F L  08/27/19 08:30


 


Pulse  79   08/27/19 08:30


 


Resp  18   08/27/19 08:30


 


BP  130/58   08/27/19 08:30


 


Pulse Ox  94 L  08/27/19 08:30








                                 Intake & Output











 08/26/19 08/27/19 08/27/19





 18:59 06:59 18:59


 


Intake Total 320  240


 


Output Total  655 


 


Balance 320 -655 240


 


Weight  77.3 kg 


 


Intake:   


 


  Intake, IV Titration 80  





  Amount   


 


    Dextrose 5% in Water 100 80  





    ml @ 35.333 mls/hr IV .   





    Q3H ONE with Magnesium   





    Sulfate 3,000 mg Rx#:   





    [id number]   


 


  Oral 240  240


 


Output:   


 


  Urine  655 


 


Other:   


 


  Voiding Method Indwelling Catheter Indwelling Catheter 














- Exam





GENERAL EXAM: Alert, pleasant 87-year-old gentleman, comfortable in no apparent 

distress.  On 3 L nasal cannula.


HEAD: Normocephalic.


EYES: Normal reaction of pupils, equal size.


NOSE: Clear with pink turbinates.


THROAT: No erythema or exudates.


NECK: No masses, no JVD.


CHEST: No chest wall deformity.


LUNGS: Equal air entry with crackles in the posterior bases right greater than 

left with dullness, diminished.


CVS: S1 and S2 normal with no audible murmur, regular rhythm.


ABDOMEN: No hepatosplenomegaly, normal bowel sounds, no guarding or rigidity.


SPINE: No scoliosis or deformity


SKIN: No rashes


CENTRAL NERVOUS SYSTEM: No focal deficits, tone is normal in all 4 extremities.


EXTREMITIES: There is no peripheral edema.  No clubbing, no cyanosis.  P

eripheral pulses are intact.





- Labs


CBC & Chem 7: 


                                 08/26/19 06:16





                                 08/27/19 10:54


Labs: 


                  Abnormal Lab Results - Last 24 Hours (Table)











  08/26/19 08/26/19 08/27/19 Range/Units





  15:43 21:24 03:49 


 


Sodium  123 L  123 L  125 L  (137-145)  mmol/L


 


Chloride    90 L  ()  mmol/L


 


Glucose    127 H  (74-99)  mg/dL


 


Calcium    7.8 L  (8.4-10.2)  mg/dL


 


TSH  4.950 H    (0.465-4.680)  mIU/L














  08/27/19 Range/Units





  10:54 


 


Sodium  125 L  (137-145)  mmol/L


 


Chloride   ()  mmol/L


 


Glucose   (74-99)  mg/dL


 


Calcium   (8.4-10.2)  mg/dL


 


TSH   (0.465-4.680)  mIU/L














Assessment and Plan


Assessment: 





Impression:





#1 Recurrent right-sided pleural effusion, status post thoracentesis 3 with 

negative cytology and microbiology, status post bronchoscopy with airway examina

tion negative for malignancy.  Plan is for Pleurx catheter placement 08/28/2019.





#2 Hyponatremia, current sodium 125.





#3 History of laryngeal carcinoma, status post chemoradiation.





#4 Hypertension.





#5 Gastroesophageal reflux disease.





#6 Degenerative joint disease.





#7 History of pulmonary embolism.





#8 History of previous tracheostomy.





Plan:





The patient was seen and evaluated by Dr. Orr.  He is currently stable from 

the pulmonary standpoint.  The plan is for Pleurx catheter placement possibly 

tomorrow.  Continued chronic sodium.  Increase his activity as tolerated.  We'll

continue to follow make further recommendations based on his clinical status.





I, the cosigning physician, performed a history & physical examination of the 

patient. Lungs sounds with basilar crackles right greater than left, dullness 

Maintaining good O2 saturations in the 90s on 3 L/m per nasal cannula I 

discussed the assessment and plan of care with my nurse practitioner, Irina Fontaine. I attest to the above note as dictated by her.

## 2019-08-28 LAB
ANION GAP SERPL CALC-SCNC: 8 MMOL/L
APTT BLD: 25.7 SEC (ref 22–30)
BUN SERPL-SCNC: 19 MG/DL (ref 9–20)
CALCIUM SPEC-MCNC: 8.2 MG/DL (ref 8.4–10.2)
CHLORIDE SERPL-SCNC: 92 MMOL/L (ref 98–107)
CO2 SERPL-SCNC: 27 MMOL/L (ref 22–30)
ERYTHROCYTE [DISTWIDTH] IN BLOOD BY AUTOMATED COUNT: 2.59 M/UL (ref 4.3–5.9)
ERYTHROCYTE [DISTWIDTH] IN BLOOD: 14.4 % (ref 11.5–15.5)
GLUCOSE SERPL-MCNC: 99 MG/DL (ref 74–99)
HCT VFR BLD AUTO: 23 % (ref 39–53)
HGB BLD-MCNC: 7.6 GM/DL (ref 13–17.5)
INR PPP: 1 (ref ?–1.2)
MAGNESIUM SPEC-SCNC: 1.4 MG/DL (ref 1.6–2.3)
MCH RBC QN AUTO: 29.2 PG (ref 25–35)
MCHC RBC AUTO-ENTMCNC: 32.9 G/DL (ref 31–37)
MCV RBC AUTO: 88.8 FL (ref 80–100)
PLATELET # BLD AUTO: 325 K/UL (ref 150–450)
POTASSIUM SERPL-SCNC: 4.1 MMOL/L (ref 3.5–5.1)
PT BLD: 10.3 SEC (ref 9–12)
SODIUM SERPL-SCNC: 127 MMOL/L (ref 137–145)
WBC # BLD AUTO: 9.5 K/UL (ref 3.8–10.6)

## 2019-08-28 PROCEDURE — 0W9930Z DRAINAGE OF RIGHT PLEURAL CAVITY WITH DRAINAGE DEVICE, PERCUTANEOUS APPROACH: ICD-10-PCS

## 2019-08-28 RX ADMIN — POTASSIUM CHLORIDE SCH: 14.9 INJECTION, SOLUTION INTRAVENOUS at 09:01

## 2019-08-28 RX ADMIN — PANTOPRAZOLE SODIUM SCH MG: 40 TABLET, DELAYED RELEASE ORAL at 06:11

## 2019-08-28 RX ADMIN — LISINOPRIL SCH MG: 20 TABLET ORAL at 08:11

## 2019-08-28 RX ADMIN — Medication SCH MG: at 08:11

## 2019-08-28 RX ADMIN — LEVOTHYROXINE SODIUM SCH MCG: 50 TABLET ORAL at 06:11

## 2019-08-28 RX ADMIN — MAGNESIUM SULFATE IN DEXTROSE SCH MLS/HR: 10 INJECTION, SOLUTION INTRAVENOUS at 13:28

## 2019-08-28 RX ADMIN — POTASSIUM CHLORIDE SCH MEQ: 750 TABLET, EXTENDED RELEASE ORAL at 08:11

## 2019-08-28 RX ADMIN — POTASSIUM CHLORIDE SCH MLS/HR: 14.9 INJECTION, SOLUTION INTRAVENOUS at 21:32

## 2019-08-28 RX ADMIN — POTASSIUM CHLORIDE SCH: 14.9 INJECTION, SOLUTION INTRAVENOUS at 21:32

## 2019-08-28 RX ADMIN — POTASSIUM CHLORIDE SCH MLS: 14.9 INJECTION, SOLUTION INTRAVENOUS at 09:45

## 2019-08-28 RX ADMIN — TAMSULOSIN HYDROCHLORIDE SCH MG: 0.4 CAPSULE ORAL at 21:31

## 2019-08-28 RX ADMIN — Medication PRN MG: at 21:31

## 2019-08-28 RX ADMIN — THERA TABS SCH EACH: TAB at 08:11

## 2019-08-28 RX ADMIN — CEFAZOLIN SCH: 330 INJECTION, POWDER, FOR SOLUTION INTRAMUSCULAR; INTRAVENOUS at 11:28

## 2019-08-28 RX ADMIN — ACETAMINOPHEN PRN MG: 325 TABLET, FILM COATED ORAL at 16:31

## 2019-08-28 RX ADMIN — MAGNESIUM SULFATE IN DEXTROSE SCH MLS: 10 INJECTION, SOLUTION INTRAVENOUS at 09:50

## 2019-08-28 NOTE — FL
EXAMINATION TYPE: FL guidance operating room

 

DATE OF EXAM: 8/28/2019

 

FLUOROSCOPY

 

Fluoroscopy time of 14 seconds was used during Pleurx catheter placement.  1 image/s document/s the keena blanco.

## 2019-08-28 NOTE — P.OP
Date of Procedure: 08/28/19


Preoperative Diagnosis: 


Recurrent right pleural effusion


Postoperative Diagnosis: 


Same


Procedure(s) Performed: 


Right Pleurx catheter placement with fluoroscopy


Implants: 


Pleurx catheter


Anesthesia: MAC


Surgeon: Ortiz Hills


Estimated Blood Loss (ml): 2


IV fluids (ml): 200


Urine output (ml): 0


Pathology: other (Right pleural fluid for cytology)


Condition: stable


Disposition: PACU


Indications for Procedure: 


87-year-old male who is had 2 previous thoracenteses for exudative pleural 

effusion without definitive diagnosis.  He presents at this time with shortness 

of breath and large recurrent pleural effusion.  Pleurx catheter placement was 

requested by pulmonary medicine.


Operative Findings: 


The right pleural space was drained after placing the Pleurx catheter and over 1

800 mL of serous fluid was obtained


Description of Procedure: 


The patient was brought to the operating room, placed supine on the operating 

table and appropriately positioned.  Right chest was percussed and the area of 

fluid was identified and marked.  The right chest and right upper quadrant were 

sterilely prepped and draped.  The marked area was anesthetized with 1% lid

ocaine.  A skinny needle was used to confirm presence of fluid beneath this site

and an 18-gauge needle was advanced into the thoracic cavity.  Guidewire was 

threaded through the 18-gauge needle and presence in the pleural space was 

confirmed on fluoroscopy.  The 18-gauge needle was removed and the wire left in 

place.  The site was enlarged to about a centimeter and a quarter with an 11 

blade and a small subcutaneous space was dissected out with a hemostat.  

Counterincision was made in the right upper quadrant under local anesthesia in 

the Pleurx catheter was tunneled from here out to the site with the wire.  The 

cuff was positioned just subcutaneous.  Introducer and dilator were placed over 

the guidewire and through the introducer sheath the Pleurx catheter was fed into

the right pleural space.  Introducer sheath was removed.  Fluoroscopy confirmed 

placement of the Pleurx catheter in the inferior portion of the right pleural 

space.  Pleurx catheter was connected to suction and 1800 mL of serous fluid was

obtained.  The catheter was secured at the exit site with a 2-0 silk.  The entry

site was closed with a 4-0 Vicryl subcuticular stitch and dressed with skin 

glue.  And it was applied over this once the glue was dry.  The Pleurx was 

disconnected from suction once all the fluid had been evacuated and capped.  

Standard Pleurx dressing was applied.  Patient was transferred to recovery in 

stable condition.

## 2019-08-28 NOTE — P.PN
Subjective


Progress Note Date: 08/28/19


Principal diagnosis: 





Shortness of breath





Patient was seen and examined.  No acute events overnight.  Patient reports 

significant improvement in his breathing since Pleurx catheter insertion.  

Patient complains of pain at the site of incision, worsened with deep 

inspiration.  He denies any nausea or vomiting.  No fever or chills.  Eating 

lunch comfortably.  Does not have home oxygen.





Objective





- Vital Signs


Vital signs: 


                                   Vital Signs











Temp  97.6 F   08/28/19 12:50


 


Pulse  76   08/28/19 12:50


 


Resp  16   08/28/19 12:50


 


BP  116/54   08/28/19 12:50


 


Pulse Ox  97   08/28/19 12:50








                                 Intake & Output











 08/27/19 08/28/19 08/28/19





 18:59 06:59 18:59


 


Intake Total 770  750


 


Output Total 


 


Balance 170 -420 -1502


 


Weight  77.7 kg 


 


Intake:   


 


  IV   750


 


  Intake, IV Titration 50  





  Amount   


 


    Sodium Chloride 0.9% 1, 50  





    000 ml @ 50 mls/hr IV .   





    Q20H [facility] Rx#:[id number]   


 


  Oral 720  


 


Output:   


 


  Urine 600 420 400


 


  Pleural Fluid   1850


 


  Estimated Blood Loss   2


 


Other:   


 


  Voiding Method Indwelling Catheter Indwelling Catheter Indwelling Catheter


 


  # Bowel Movements  1 














- Exam





General: [non toxic], [no distress on 3 L nasal cannula], [appears at stated 

age]


Derm: [warm], [dry]


Head: [atraumatic], [normocephalic], [symmetric]


Eyes: [EOMI], [no lid lag], [anicteric sclera]


Mouth: [no lip lesion], [mucus membranes moist]


Cardiovascular: [S1S2 reg], [no murmur], [Pleurx catheter inserted right lateral

 chest]


Lungs: [CTA bilateral], [no rhonchi, no rales] , [no accessory muscle use]


Abdominal: [soft], [ nontender to palpation], [no guarding], [no appreciable 

organomegaly]


Ext: [no gross muscle atrophy], [no edema], [no contractures]


Neuro:  [no focal neuro deficits]


Psych: [Alert], [oriented], [appropriate affect] 





- Labs


CBC & Chem 7: 


                                 08/28/19 07:10





                                 08/28/19 07:10


Labs: 


                  Abnormal Lab Results - Last 24 Hours (Table)











  08/27/19 08/28/19 08/28/19 Range/Units





  17:26 07:10 07:10 


 


RBC   2.59 L   (4.30-5.90)  m/uL


 


Hgb   7.6 L   (13.0-17.5)  gm/dL


 


Hct   23.0 L   (39.0-53.0)  %


 


Sodium  125 L   127 L  (137-145)  mmol/L


 


Chloride    92 L  ()  mmol/L


 


Calcium    8.2 L  (8.4-10.2)  mg/dL


 


Magnesium    1.4 L  (1.6-2.3)  mg/dL














Assessment and Plan


Assessment: 





Recurrent right-sided pleural effusion, loculated


Hypomagnesemia


Hyponatremia


Hypothyroidism


Urinary retention





Pleurx catheter inserted today, confirmed on chest x-ray.  Plans: Norco Dilaudid

for pain management.  O2 per NC to maintain O2 saturation greater than 92%.  

Follow pulmonology recommendations.


Magnesium 1.4.  Plans: Replace via protocol.


Sodium 127.  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  High urine osmolality along 

with urine sodium.  Plans: Continue normal saline at 50 mL per hour.  Follow 

nephrology recommendations.  Patient might benefit from fluid restriction?  

Daily BMP.


Plans: Continue Synthroid.


Plans: Continue Waterman catheter.  Continue Flomax.  Voiding trials.





[Patient admitted for recurrent right-sided pleural effusion.  Underwent Pleurx 

catheter today.  He is pending clinical improvement.  Pulmonology on board.  

Likely DC in 1-2 days.]

## 2019-08-28 NOTE — XR
EXAMINATION TYPE: XR chest 1V portable

 

DATE OF EXAM: 8/28/2019

 

Comparison: 8/28/2019

 

Clinical History: 87-year-old male post Pleurx placement, once in PACU

 

Findings:

Limits of normal in size. Right hilar rounded opacity. Loculated right basilar pneumothorax with Pleu
rx catheter in place. Visualized upper lungs are clear. Possible trace left pleural effusion.

 

 

Impression:

 

1. Evacuation of most of the right pleural effusion with appearance of a loculated pneumothorax now a
t the right base. Pleurx catheter noted.

2. Rounded density right perihilar region could represent underlying mass.

3. Possible trace left effusion.

## 2019-08-28 NOTE — XR
EXAMINATION TYPE: XR chest 1V portable

 

DATE OF EXAM: 8/28/2019

 

Comparison: 8/27/2019

 

Clinical History: 87-year-old male Right pleural effusion 

 

Findings:

Right heart margin obscured by adjacent pleural parenchymal opacity. Persistent moderate to large rig
ht effusion. Trace left effusion and patchy left basilar opacity also remains. Some mild patchy densi
ty peripheral left midlung shows partial improvement.

 

 

Impression:

 

1. Continued moderate to large right pleural effusion.

2. Similar trace left effusion with adjacent atelectasis and/or consolidation.

3. Improving aeration at the left midlung.

## 2019-08-28 NOTE — P.PN
Subjective


Progress Note Date: 08/28/19


Principal diagnosis: 


 Recurrent right-sided pleural effusion, exudative





 On 08/28/2019 patient seen in follow-up on selective care unit, he is awake and

alert, today he had right Pleurx catheter placed under fluoroscopy by Dr. Hills, and there was 1800 mL of serous fluid drained.  Follow-up chest x-ray 

showed evacuation of most of the right pleural effusion with appearance of a 

loculated pneumothorax at the right base, and a rounded density in the right 

perihilar region that could represent underlying mass, and possible trace left 

pleural effusion.  Pleural fluid was sent for cytology.  Today's labs have been 

reviewed, showing white blood cell count of 9.5, hemoglobin of 7.6, correlation 

profile was within normal limits, sodium is improving, up to 127, patient is on 

fluid restriction, potassium is 4.1, chloride is 92, renal profile is within 

normal limits, nephrology is following.  Today's magnesium is 1.4.  Patient was 

seen in follow-up following his surgical procedure, he is awake and alert, in no

acute distress, he is currently on 2 L of oxygen with a pulse ox of 96%, lung 

sounds reveal some bibasilar rhonchi, and diminished breath sounds over right 

lower lobe, right anterior Pleurx catheter is taped to the chest wall, under a 

sterile dressing.  Vital signs are stable, no specific complaints.








Objective





- Vital Signs


Vital signs: 


                                   Vital Signs











Temp  97.9 F   08/28/19 15:59


 


Pulse  82   08/28/19 15:59


 


Resp  18   08/28/19 15:59


 


BP  103/65   08/28/19 15:59


 


Pulse Ox  96   08/28/19 15:59








                                 Intake & Output











 08/27/19 08/28/19 08/28/19





 18:59 06:59 18:59


 


Intake Total 770  750


 


Output Total 


 


Balance 170 420 -1902


 


Weight  77.7 kg 


 


Intake:   


 


  IV   750


 


  Intake, IV Titration 50  





  Amount   


 


    Sodium Chloride 0.9% 1, 50  





    000 ml @ 50 mls/hr IV .   





    Q20H [facility] Rx#:[id number]   


 


  Oral 720  


 


Output:   


 


  Urine 600 420 800


 


  Pleural Fluid   1850


 


  Estimated Blood Loss   2


 


Other:   


 


  Voiding Method Indwelling Catheter Indwelling Catheter Indwelling Catheter


 


  # Bowel Movements  1 














- Exam


 GENERAL EXAM: Alert, pleasant, 87-year-old white male, on 2 L of oxygen with a 

pulse ox of 96% comfortable in no apparent distress.


HEAD: Normocephalic/atraumatic.


EYES: Normal reaction of pupils, equal size.  Conjunctiva pink, sclera white.


NOSE: Clear with pink turbinates.


THROAT: No erythema or exudates.


NECK: No masses, no JVD, no thyroid enlargement, no adenopathy.


CHEST: No chest wall deformity.  Symmetrical expansion.  Right anterior chest 

wall proximal catheters taped to the chest wall under sterile dressing


LUNGS: Equal air entry with management sounds over right lower lobe, and bibasi

lar rhonchi,


CVS: Regular rate and rhythm, normal S1 and S2, no gallops, no murmurs, no rubs


ABDOMEN: Soft, nontender.  No hepatosplenomegaly, normal bowel sounds, no 

guarding or rigidity.


EXTREMITIES: No clubbing, no edema, no cyanosis, 2+ pulses and upper and lower 

extremities.


MUSCULOSKELETAL: Muscle strength and tone normal.


SPINE: No scoliosis or deformity


SKIN: No rashes


CENTRAL NERVOUS SYSTEM: Alert and oriented -3.  No focal deficits, tone is 

normal in all 4 extremities.


PSYCHIATRIC: Alert and oriented -3.  Appropriate affect.  Intact judgment and 

insight.











- Labs


CBC & Chem 7: 


                                 08/28/19 07:10





                                 08/28/19 07:10


Labs: 


                  Abnormal Lab Results - Last 24 Hours (Table)











  08/27/19 08/28/19 08/28/19 Range/Units





  17:26 07:10 07:10 


 


RBC   2.59 L   (4.30-5.90)  m/uL


 


Hgb   7.6 L   (13.0-17.5)  gm/dL


 


Hct   23.0 L   (39.0-53.0)  %


 


Sodium  125 L   127 L  (137-145)  mmol/L


 


Chloride    92 L  ()  mmol/L


 


Calcium    8.2 L  (8.4-10.2)  mg/dL


 


Magnesium    1.4 L  (1.6-2.3)  mg/dL














Assessment and Plan


Plan: 


 Assessment:





#1 Recurrent right-sided pleural effusion, status post thoracentesis 3 with 

negative cytology and microbiology, status post bronchoscopy with airway 

examination negative for malignancy.  Plan is for Pleurx catheter placement 

08/28/2019.





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





#3 History of laryngeal carcinoma, status post chemoradiation.





#4 Hypertension.





#5 Gastroesophageal reflux disease.





#6 Degenerative joint disease.





#7 History of pulmonary embolism.





#8 History of previous tracheostomy.





Plan:





We'll continue current medical treatment, encourage deep breathing and coughing,

pleural fluid cytology is pending, vital signs are stable, no acute distress, 

follow-up chest x-ray has been reviewed, showing evacuation of most of the 

right-sided pleural effusion, and the rounded density in the right perihilar 

region.  Possibility of occult malignancy is possible and is suspected however 

because of patient's advanced age and comorbidities, even if the pleural fluid 

came back positive for malignancy, the best treatment we could offer the patient

would probably be is SBRT.  Will await the results of the pleural fluid 

cytology, encourage incentive spirometry use.  We'll continue to follow.





I performed a history & physical examination of the patient and discussed their 

management with my nurse practitioner, Tanisha Morales.  I reviewed the nurse 

practitioner's note and agree with the documented findings and plan of care.  L

manuel sounds are positive for diminished breath sounds over right lower lobe, with

bibasilar rhonchi.  The findings and the impression was discussed with the 

patient.  I attest to the documentation by the nurse practitioner. 











Time with Patient: Less than 30

## 2019-08-29 LAB
ANION GAP SERPL CALC-SCNC: 8 MMOL/L
BUN SERPL-SCNC: 23 MG/DL (ref 9–20)
CALCIUM SPEC-MCNC: 8.2 MG/DL (ref 8.4–10.2)
CHLORIDE SERPL-SCNC: 93 MMOL/L (ref 98–107)
CO2 SERPL-SCNC: 26 MMOL/L (ref 22–30)
GLUCOSE SERPL-MCNC: 116 MG/DL (ref 74–99)
POTASSIUM SERPL-SCNC: 4.7 MMOL/L (ref 3.5–5.1)
SODIUM SERPL-SCNC: 127 MMOL/L (ref 137–145)

## 2019-08-29 RX ADMIN — POTASSIUM CHLORIDE SCH MEQ: 750 TABLET, EXTENDED RELEASE ORAL at 09:32

## 2019-08-29 RX ADMIN — CEFAZOLIN SCH: 330 INJECTION, POWDER, FOR SOLUTION INTRAMUSCULAR; INTRAVENOUS at 11:10

## 2019-08-29 RX ADMIN — LEVOTHYROXINE SODIUM SCH MCG: 50 TABLET ORAL at 06:47

## 2019-08-29 RX ADMIN — LISINOPRIL SCH MG: 20 TABLET ORAL at 09:32

## 2019-08-29 RX ADMIN — PANTOPRAZOLE SODIUM SCH MG: 40 TABLET, DELAYED RELEASE ORAL at 06:47

## 2019-08-29 RX ADMIN — THERA TABS SCH EACH: TAB at 09:32

## 2019-08-29 RX ADMIN — TAMSULOSIN HYDROCHLORIDE SCH MG: 0.4 CAPSULE ORAL at 21:23

## 2019-08-29 RX ADMIN — TAMSULOSIN HYDROCHLORIDE SCH MG: 0.4 CAPSULE ORAL at 18:17

## 2019-08-29 RX ADMIN — Medication SCH MG: at 09:32

## 2019-08-29 NOTE — XR
EXAMINATION TYPE: XR chest 1V portable

 

DATE OF EXAM: 8/29/2019

 

CLINICAL HISTORY: Difficulty breathing progress study.  Pleural drainage catheter.

 

TECHNIQUE: Single AP portable upright view of the chest is obtained.

 

COMPARISON: Chest x-ray from one day earlier and older studies.

 

FINDINGS:  There is persistent right basilar pleural drainage catheter with adjacent subcutaneous emp
hysema. There is worsening right basilar opacity. There is stable left basilar opacity. Cardiac silho
uette size is stable mildly enlarged with atherosclerotic thoracic aorta. Osseous structures are inta
ct.

 

IMPRESSION: Persistent mild cardiomegaly and small to tiny left pleural effusion with associated left
 basilar atelectasis and/or infiltrate. Persistent right basilar drainage catheter with stable size o
f the air-filled cavity but worsening fluid component or hemothorax is suspected. Stable adjacent rig
ht basilar atelectasis and/or infiltrate.

## 2019-08-29 NOTE — P.DS
Providers


Date of admission: 


08/25/19 12:42





Expected date of discharge: 08/29/19


Attending physician: 


Holly Betts DO





Consults: 





                                        





08/25/19 12:41


Consult Physician Urgent 


   Consulting Provider: Trae Orr


   Consult Reason/Comments: Pleural effusion


   Do you want consulting provider notified?: Yes





08/25/19 15:02


Consult Physician Routine 


   Consulting Provider: Rios Antonio


   Consult Reason/Comments: pleural effusion


   Do you want consulting provider notified?: Yes





08/26/19 13:54


Consult Physician Routine 


   Consulting Provider: Clifford Duenas


   Consult Reason/Comments: Hyponatremia


   Do you want consulting provider notified?: Yes











Primary care physician: 


Fermin Jacob





Hospital Course: 





87-year-old  male with a past medical history of recurrent pleural 

effusion since July 2019 requiring thoracentesis 3, prior laryngeal cancer 

status post chemo, radiation, and tracheostomy, and hypertension as well as 

multiple other comorbid conditions who presented to the ER with weakness.  On 

arrival to the emergency department he underwent an extensive evaluation.  His 

initial heart rate was 117.  Laboratory analysis showed a hemoglobin of 8.4, 

sodium 124, glucose 137, magnesium 0.5, and albumin 3.3.  Chest x-ray showed 

left basilar acute atelectasis or infiltrate as well as a moderate right-sided 

pleural effusion with no significant change. He has seen his PCP multiple times.

 Dr. Jacob discovered a pleural effusion on the right.  He initially had a 

thoracentesis done along with CAT scan on [facility].  He was 

then referred to Dr. Antonio who did not feel surgery was indicated at this point 

in time but referred him to Dr. Orr.  He underwent 1 thoracentesis with Dr. Orr on 7/25 which was hemorrhagic in nature, he then underwent bronchoscopy on

8/16 pathology consistent with reactive cells but no signs of malignancy.  Had 

worsening shortness of breath and underwent thoracentesis with interventional 

radiology on 8/20.





Patient underwent Pleurx catheter placement on 08/28/2018 under cardiothoracic 

surgery.  1800 mL of serous fluid was drained.  Pleural fluid was sent for 

cytology.





Patient was seen and examined prior to discharge.  No acute events overnight.  

Patient reports considerable improvement in his breathing since Pleurx catheter 

was inserted.  He denies any chest pain, shortness of breath or palpitations.  

No nausea or vomiting.  No fever or chills.  He is looking for to going home.





General: [non toxic], [no distress on 3 L nasal cannula], [appears at stated 

age]


Derm: [warm], [dry]


Head: [atraumatic], [normocephalic], [symmetric]


Eyes: [EOMI], [no lid lag], [anicteric sclera]


Mouth: [no lip lesion], [mucus membranes moist]


Cardiovascular: [S1S2 reg], [no murmur], [Pleurx catheter inserted right lateral

 chest]


Lungs: [CTA bilateral], [no rhonchi, no rales] , [no accessory muscle use]


Abdominal: [soft], [ nontender to palpation], [no guarding], [no appreciable 

organomegaly]


Ext: [no gross muscle atrophy], [no edema], [no contractures]


Neuro:  [no focal neuro deficits]


Psych: [Alert], [oriented], [appropriate affect] 





Recurrent right-sided pleural effusion, loculated


Hypomagnesemia


Hyponatremia


Hypothyroidism


Urinary retention





Pleurx catheter inserted today, confirmed on chest x-ray.  Repeat chest x-ray 

this morning shows small to tiny left pleural effusion, persistent right basilar

drainage catheter with air-filled cavity.  Plans: Norco for pain management.  O2

per NC to maintain O2 saturation greater than 92%.  Follow pulmonology 

recommendations.


Magnesium 1.4 to 1.7.  Plans: Replace via protocol.


Sodium 127.  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  High urine osmolality along 

with urine sodium.  Plans: Continue normal saline at 50 mL per hour.  Follow 

nephrology recommendations.  Asymptomatic, repeat in 3 days.  Daily BMP.


Plans: Continue Synthroid.


Plans: DC Waterman catheter.  Continue Flomax.  Voiding trials.





[Patient admitted for recurrent right-sided pleural effusion.  Underwent Pleurx 

catheter yesterday.  Cleared by cardiothoracic surgery and pulmonology.  Waterman 

catheter removed, waiting for patient to urinate.  Follow 6 minute walk test for

 home O2.  Possible DC today.]





Pertinent Studies: 





Chest x-ray, echocardiogram


Procedures: 





Pleurx catheter insertion


Patient Condition at Discharge: Stable





Plan - Discharge Summary


Discharge Rx Participant: No


New Discharge Prescriptions: 


New


   RX: HYDROcodone/APAP 5-325MG [Norco 5-325] 1 each PO Q4HR PRN #18 tab


     PRN Reason: Moderate Pain





Continue


   RX: Multivitamin/Iron/Folic Acid [Centrum Complete Multivit Tab] 1 tab PO 

DAILY


   RX: amLODIPine BESYLATE/BENAZEPRIL [amLODIPine BESYLATE/BENAZEPRIL 10-40 MG] 

1 cap PO DAILY


   RX: Tamsulosin HCl [Flomax] 0.4 mg PO HS


   RX: Omeprazole [PriLOSEC] 20 mg PO DAILY


   RX: Thiamine [Vitamin B-1] 100 mg PO DAILY


   RX: Ferrous Sulfate [Iron (65 MG Elemental)] 325 mg PO DAILY


   RX: Potassium Chloride [Klor-Con 10] 10 meq PO DAILY


   RX: Furosemide [Lasix] 20 mg PO DAILY


   RX: Levothyroxine Sodium [Synthroid] 50 mcg PO DAILY


   RX: Acetaminophen/Diphenhydramine [Tylenol -25mg] 1 tab PO HS


Discharge Medication List





RX: Multivitamin/Iron/Folic Acid [Centrum Complete Multivit Tab] 1 tab PO DAILY 

02/12/18 [History]


RX: Omeprazole [PriLOSEC] 20 mg PO DAILY 02/12/18 [History]


RX: Tamsulosin HCl [Flomax] 0.4 mg PO HS 02/12/18 [History]


RX: amLODIPine BESYLATE/BENAZEPRIL [amLODIPine BESYLATE/BENAZEPRIL 10-40 MG] 1 

cap PO DAILY 02/12/18 [History]


RX: Thiamine [Vitamin B-1] 100 mg PO DAILY 08/13/19 [History]


RX: Ferrous Sulfate [Iron (65 MG Elemental)] 325 mg PO DAILY 08/15/19 [History]


RX: Furosemide [Lasix] 20 mg PO DAILY 08/20/19 [History]


RX: Potassium Chloride [Klor-Con 10] 10 meq PO DAILY 08/20/19 [History]


RX: Acetaminophen/Diphenhydramine [Tylenol -25mg] 1 tab PO HS 08/25/19 

[History]


RX: Levothyroxine Sodium [Synthroid] 50 mcg PO DAILY 08/25/19 [History]


RX: HYDROcodone/APAP 5-325MG [Norco 5-325] 1 each PO Q4HR PRN #18 tab 08/29/19 

[Rx]








Follow up Appointment(s)/Referral(s): 


Fermin Jacob MD [Primary Care Provider] - 1-2 days


Katie Alexis MD [STAFF PHYSICIAN] - 1 Week


Trae Orr DO [Doctor of Osteopathic Medicine] - 1 Week


[facility] Visiting Nurse, [NON-STAFF] - As Needed


Ambulatory/Diagnostic Orders: 


Complete Blood Count w/diff [LAB.AMB] Time Frame: 3 Days, Location: None Se

lected


Comprehensive Metabolic Panel [LAB.AMB] Time Frame: 3 Days, Location: None 

Selected


Activity/Diet/Wound Care/Special Instructions: 


Pleur-X catheter instructions


1.  Home Care is ordered, they will obtain new bottles.


2.  May shower after 24 hours, no tub baths/hot tubs.


3.  Do not drain more than 1 liter or 1000 mL in 24 hours.


4.  New drainage bottle needed with each drainage.


5.  Drainage frequency dictated by patient symptoms, may be every day, every 

other day, weekly, or however often the patient is symptomatic.


6.  Please notify NP or office if temperature >101F, excessive pain at insertion

 site, drainage consistency changes to cloudy or smells bad, catheter falls out,

 or anything else that concerns you.


7.  Contact surgery office with weekly drainage amounts.  May fax the amounts.


8.  Once drainage is less than 50 mL three times in a row, notify the surgery 

office for possible removal.





Surgery office: [phone], fax [phone]


NP: Yesenia [phone], Juan [phone]





Discharge Disposition: HOME SELF-CARE

## 2019-08-29 NOTE — P.PN
Subjective


Progress Note Date: 08/29/19


Principal diagnosis: 





Recurrent right pleural effusion, acute urinary retention, hyponatremia, 

hypomagnesemia.  Previous medical history of laryngeal cancer status post 

chemotherapy and radiation with tracheostomy, hypothyroidism, BPH, 

hyperlipidemia, osteoarthritis, pulmonary embolus and hypertension.





POD #1 right Pleurx catheter placement with fluoroscopy





The patient's currently sitting up in bed in no acute distress.  Denies pain or 

shortness of breath.  Patient states he feels much better since Pleurx catheter 

placed.  No new concerns.





Objective





- Vital Signs


Vital signs: 


                                   Vital Signs











Temp  98.3 F   08/29/19 04:20


 


Pulse  92   08/29/19 04:20


 


Resp  18   08/29/19 04:20


 


BP  122/53   08/29/19 04:20


 


Pulse Ox  93 L  08/29/19 04:20








                                 Intake & Output











 08/28/19 08/29/19 08/29/19





 18:59 06:59 18:59


 


Intake Total 750 640 240


 


Output Total 2652 400 


 


Balance -1902 240 240


 


Weight  73.8 kg 


 


Intake:   


 


    


 


  Intake, IV Titration  400 





  Amount   


 


    Sodium Chloride 0.9% 50  400 





    ml @ 0 mls/hr IV .STK-MED   





    ONE with ceFAZolin 1,000   





    mg Rx#:[id number]   


 


  Oral  240 240


 


Output:   


 


  Urine 800 400 


 


  Pleural Fluid 1850  


 


  Estimated Blood Loss 2  


 


Other:   


 


  Voiding Method Indwelling Catheter Indwelling Catheter 














- Constitutional


General appearance: Present: cooperative, no acute distress





- Respiratory


Details: 





Lungs sounds diminished bilaterally.  Respirations even, nonlabored.  Currently 

on 2 L nasal cannula with oxygen saturation 97%.  Right Pleurx catheter present 

covered with dry intact dressing.





- Cardiovascular


Details: 





S1, S2 present.  Regular rate and rhythm, sinus rhythm on telemetry.  Palpable 

peripheral pulses bilaterally.  No edema present.  No calf pain or tenderness 

noted.





- Gastrointestinal


Gastrointestinal Comment(s): 





Abdomen soft, nontender, nondistended.  Active bowel sounds 4 quadrants.  

Tolerating diet.





- Genitourinary


Genitourinary Comment(s): 





Waterman present draining clear, yellow urine.  Output 400 mL overnight





- Integumentary


Integumentary Comment(s): 





Skin is warm and dry with evidence of good perfusion.





- Neurologic


Neurologic: Present: CNII-XII intact





- Musculoskeletal


Musculoskeletal: Present: gait normal, strength equal bilaterally





- Psychiatric


Psychiatric: Present: A&O x's 3, appropriate affect, intact judgment & insight





- Allied health notes


Allied health notes reviewed: nursing





- Labs


CBC & Chem 7: 


                                 08/28/19 07:10





                                 08/28/19 07:10


Labs: 


                  Abnormal Lab Results - Last 24 Hours (Table)











  08/29/19 Range/Units





  06:20 


 


Osmolality  266 L  (280-301)  mosm/kg














- Imaging and Cardiology


Chest x-ray: report reviewed, image reviewed





Assessment and Plan


Assessment: 





1.  Recurrent right-sided pleural effusion


2.  Acute urinary retention, history of BPH


3.  Hyponatremia 


4.  Hypomagnesemia 


5.  Hypertension


6.  Hypothyroidism


7.  History of laryngeal cancer status post chemo and radiation, status post 

tracheostomy





Plan: 





1.  Pleurx catheter to be drained this morning with teaching it to patient and 

family.


2.  Wean O2 as tolerated.  Incentive spirometry ordered, encourage use.


3.  Medical management per primary care service


4.  Patient may be discharged to home with home care from our standpoint when 

okay with other services.  Pleurx catheter discharge instructions placed on the 

discharge plan.


5.  Will continue to see as needed while hospitalized.  Please call with any 

further questions


 


Time with Patient: Greater than 30

## 2019-08-29 NOTE — P.PN
Subjective


Progress Note Date: 08/29/19


Principal diagnosis: 





Recurrent right-sided pleural effusions.





This is a pleasant 87-year-old gentleman with a history of recurrent right-sided

pleural effusions.  Status post 3 previous thoracentesis.  The effusions are 

exudative in nature.  He had also undergone bronchoscopy with BAL.  He presented

here to the hospital again with complaints of increasing weakness and inability 

to stand.  He was found to be hyponatremic with a sodium of 124.  He is seen 

today in follow-up on the selective care unit.  Currently awake and alert in no 

acute distress.  Maintaining O2 saturations in the low 90s on 3 L/m per nasal 

cannula.  He's been afebrile.  Hemodynamically stable.  Today's chest x-ray 

shows overall stable findings.  There is evidence of cardiomegaly with tiny left

pleural effusion and associated left basilar atelectasis/infiltrate.  There is 

also moderate to large right-sided pleural effusion with atelectasis/infiltrate.

 The plan is for Pleurx catheter placement tomorrow by Dr. Hills.  Current 

sodium 125.  Potassium 4.0.  Chloride 90.  Bicarb 24.  Creatinine 0.99.  

Currently with a 0.9 normal saline at 50 MLS per hour.





The patient is seen today 08/29/2019 in follow-up on the selective care unit.  

He is currently awake and alert in no acute distress.  Sitting up in a chair at 

the bedside.  Chest x-ray shows persistent mild cardiomegaly with small to tiny 

left pleural effusion with associated left basilar atelectasis/infiltrate.  

There is persistent right basilar drainage catheter in place with air-filled 

cavity but worsening fluid component or hemothorax is suspected.  Stable 

adjacent right basilar atelectasis/infiltrate.  Sodium 127.  Creatinine 1.23.





Objective





- Vital Signs


Vital signs: 


                                   Vital Signs











Temp  98.3 F   08/29/19 08:00


 


Pulse  88   08/29/19 08:00


 


Resp  18   08/29/19 08:10


 


BP  112/58   08/29/19 08:00


 


Pulse Ox  95   08/29/19 08:10








                                 Intake & Output











 08/28/19 08/29/19 08/29/19





 18:59 06:59 18:59


 


Intake Total 750 640 250


 


Output Total 2652 400 


 


Balance -1902 240 250


 


Weight  73.8 kg 


 


Intake:   


 


    10


 


    Invasive Line 1   10


 


  Intake, IV Titration  400 





  Amount   


 


    Sodium Chloride 0.9% 50  400 





    ml @ 0 mls/hr IV .STK-MED   





    ONE with ceFAZolin 1,000   





    mg Rx#:[id number]   


 


  Oral  240 240


 


Output:   


 


  Urine 800 400 


 


  Pleural Fluid 1850  


 


  Estimated Blood Loss 2  


 


Other:   


 


  Voiding Method Indwelling Catheter Indwelling Catheter Indwelling Catheter














- Exam





GENERAL EXAM: Alert, pleasant 87-year-old gentleman, comfortable in no apparent 

distress.  On 3 L nasal cannula.


HEAD: Normocephalic.


EYES: Normal reaction of pupils, equal size.


NOSE: Clear with pink turbinates.


THROAT: No erythema or exudates.


NECK: No masses, no JVD.


CHEST: No chest wall deformity.  Left Pleurx catheter placed.


LUNGS: Equal air entry with crackles in the posterior bases right greater than 

left with dullness, diminished.


CVS: S1 and S2 normal with no audible murmur, regular rhythm.


ABDOMEN: No hepatosplenomegaly, normal bowel sounds, no guarding or rigidity.


SPINE: No scoliosis or deformity


SKIN: No rashes


CENTRAL NERVOUS SYSTEM: No focal deficits, tone is normal in all 4 extremities.


EXTREMITIES: There is no peripheral edema.  No clubbing, no cyanosis.  

Peripheral pulses are intact.





- Labs


CBC & Chem 7: 


                                 08/28/19 07:10





                                 08/29/19 06:20


Labs: 


                  Abnormal Lab Results - Last 24 Hours (Table)











  08/29/19 08/29/19 Range/Units





  06:20 06:20 


 


Sodium   127 L  (137-145)  mmol/L


 


Chloride   93 L  ()  mmol/L


 


BUN   23 H  (9-20)  mg/dL


 


Glucose   116 H  (74-99)  mg/dL


 


Osmolality  266 L   (280-301)  mosm/kg


 


Calcium   8.2 L  (8.4-10.2)  mg/dL














Assessment and Plan


Assessment: 





Impression:





#1 Recurrent right-sided pleural effusion, status post thoracentesis 3 with 

negative cytology and microbiology, status post bronchoscopy with airway 

examination negative for malignancy. Pleurx catheter placed 08/28/2019.  

Cytology pending.





#2 Hyponatremia, current sodium 125.





#3 History of laryngeal carcinoma, status post chemoradiation.





#4 Hypertension.





#5 Gastroesophageal reflux disease.





#6 Degenerative joint disease.





#7 History of pulmonary embolism.





#8 History of previous tracheostomy.





Plan:





The patient was seen and evaluated by Dr. Orr.  He is currently stable from 

the pulmonary standpoint.  The plan is to discharge the patient home with his 

Pleurx catheter in place.  Education has been provided to the family.  Visiting 

nurses are to be scheduled as well well.





I, the cosigning physician, performed a history & physical examination of the 

patient. Lungs sounds with basilar crackles right greater than left, dullness.  

Pleurx catheter in place.  Maintaining good O2 saturations in the 90s on 3 L/m 

per nasal cannula I discussed the assessment and plan of care with my nurse 

practitioner, Irina Fontaine. I attest to the above note as dictated by her.

## 2019-08-29 NOTE — CDI
Documentation Clarification Form



Date: 8/29/2019 3:16:56 PM

From: Fina Billingsley RN, CCDS

Phone: [phone]

MRN: [id number]

Admit Date: 8/25/2019 12:42:00 PM

Patient Name: Gilberto Grimaldo

Visit Number: [id number]

Discharge Date:  





ATTENTION: The Clinical Documentation Specialists (CDI) and [facility] Coding Staff 
appreciate your assistance in clarifying documentation. Please respond to the 
clarification below the line at the bottom and electronically sign. The CDI & 
[facility] Coding staff will review the response and follow-up if needed. Please note: 
Queries are made part of the Legal Health Record. If you have any questions, 
please contact the author of this message via ITS.



Dr. Viviana Mora



A diagnosis of anemia lacks specificity to accurately reflect your patients 
severity of condition and clarification is needed.  



History/Risk Factors: Throat cancer Radiation and Chemotherapy, Hypertension 
Pulmonary Embolus Former smoker



Clinical indicators: 87-year-old male with complaints of difficulty breathing. 
He has a past medical history of anemia per H&P.

Hemoglobin: 8.4,   7.8,      7.6

Hematocrit:   24.6,  23.5    23.0



Treatment:        

Monitor CBC

Theragran PO



In order to capture the severity of condition, please clarify the type of anemia
and etiology if known:



Acute blood loss anemia

Acute on chronic blood loss anemia

Chronic blood loss anemia

Iron deficiency anemia

Anemia due to malignancy

Nutritional anemia

Unable to determine

Other, please specify 



(Last Revision: October 2017)

_______________________________________anemia of chronic disease, iron studies 
were done during previous admissions____________________________________

MTDD

## 2019-08-29 NOTE — PN
PROGRESS NOTE



Patient is seen for followup for hyponatremia.  This morning, patient is sitting up in

a bedside chair.  He is comfortable.  He denies any significant complaints.  He wants

to go home today.  He was maintained on normal saline, however, it looks like it has

been turned down to LR at 20 mL an hour.



PHYSICAL EXAMINATION:

Blood pressure this morning was 122/53, heart rate about 80 per minute.  Patient is

afebrile.  Examination of the heart S1, S2.  Examination of the lungs, bilateral breath

sounds are heard.  Abdomen is soft, nontender.  Examination of lower extremities shows

trace edema in the feet.  CNS exam grossly intact.



LABS:

Show sodium 127, potassium 4.7, BUN 23, serum creatinine 1.23, hemoglobin of 7.6 g/dL.



ASSESSMENT:

1. Hyponatremia, euvolemic.  The patient does have some pedal edema.  He was

    maintained on IV normal saline initially and serum sodium had improved with a small

    dose of Lasix along with saline.  He has been staying at 127 am for the past 2

    days.  I have advised him to increase his protein intake.  He can be discharged and

    he will be given 1 dose of sodium chloride tabs and he should have labs done as

    outpatient in about 3-4 days.  Avoid thiazide diuretics.  May continue with the

    current dose of Lasix at 20 mg daily.

2. Hypertension, currently controlled.

3. Anemia with no active bleeding noted.  Hemoglobin is down to 7.6 g/dL.  Previous

    iron saturation was only 9.9% on 08/6/2019.  If patient is not discharged I will

    give him a dose of IV iron.

4. Hypomagnesemia status post replacement.

5. Hypokalemia, status post replacement.  Potassium staying stable at 4.1 to 4.7

    mEq/L.



PLAN:

Okay for discharge.  Sodium chloride 1 g x1.  Follow up as outpatient with repeat labs

in about 4-5 days as outpatient.  Maintain good protein intake.  Avoid thiazide

diuretics.  Avoid excessive free water intake as well.





MMODL / IJN: [id number] / Job#: [id number]

## 2019-08-30 VITALS — DIASTOLIC BLOOD PRESSURE: 58 MMHG | HEART RATE: 84 BPM | SYSTOLIC BLOOD PRESSURE: 131 MMHG | TEMPERATURE: 97.7 F

## 2019-08-30 VITALS — RESPIRATION RATE: 20 BRPM

## 2019-08-30 LAB
ERYTHROCYTE [DISTWIDTH] IN BLOOD BY AUTOMATED COUNT: 2.56 M/UL (ref 4.3–5.9)
ERYTHROCYTE [DISTWIDTH] IN BLOOD: 14.6 % (ref 11.5–15.5)
HCT VFR BLD AUTO: 23.3 % (ref 39–53)
HGB BLD-MCNC: 7.5 GM/DL (ref 13–17.5)
MCH RBC QN AUTO: 29.3 PG (ref 25–35)
MCHC RBC AUTO-ENTMCNC: 32.2 G/DL (ref 31–37)
MCV RBC AUTO: 91.1 FL (ref 80–100)
PLATELET # BLD AUTO: 315 K/UL (ref 150–450)
WBC # BLD AUTO: 9 K/UL (ref 3.8–10.6)

## 2019-08-30 RX ADMIN — CEFAZOLIN SCH: 330 INJECTION, POWDER, FOR SOLUTION INTRAMUSCULAR; INTRAVENOUS at 07:38

## 2019-08-30 RX ADMIN — THERA TABS SCH EACH: TAB at 08:16

## 2019-08-30 RX ADMIN — PANTOPRAZOLE SODIUM SCH MG: 40 TABLET, DELAYED RELEASE ORAL at 06:35

## 2019-08-30 RX ADMIN — LEVOTHYROXINE SODIUM SCH MCG: 50 TABLET ORAL at 06:35

## 2019-08-30 RX ADMIN — TAMSULOSIN HYDROCHLORIDE SCH MG: 0.4 CAPSULE ORAL at 08:16

## 2019-08-30 RX ADMIN — POTASSIUM CHLORIDE SCH MEQ: 750 TABLET, EXTENDED RELEASE ORAL at 08:16

## 2019-08-30 RX ADMIN — Medication SCH MG: at 08:16

## 2019-08-30 RX ADMIN — POTASSIUM CHLORIDE SCH: 14.9 INJECTION, SOLUTION INTRAVENOUS at 07:38

## 2019-08-30 RX ADMIN — LISINOPRIL SCH MG: 20 TABLET ORAL at 08:16

## 2019-08-30 NOTE — PN
PROGRESS NOTE



Patient was given a dose of sodium chloride tab yesterday.  We do not have labs from

today.  Patient wants to go home.  He is technically discharged.  He can go; however, I

would like to have a sodium level from today prior to discharge.  Patient denies any

significant complaints.  He is feeling well.  No complaints of chest pain, shortness of

breath, nausea, vomiting.



PHYSICAL EXAMINATION:

Blood pressure was 131/58, heart rate 84 per minute.  He is afebrile.

Examination of the heart S1, S2.  Examination of the lungs, bilateral breath sounds are

heard.  Abdomen is soft, nontender.  Examination of the lower extremities shows pedal

edema 1+ bilaterally. CNS exam grossly intact.



LABS:

Not available from today.



ASSESSMENT:

1. Hyponatremia, euvolemic, status post dose of sodium chloride yesterday.  I will

    check a serum sodium today prior to discharge.  Patient may continue with his

    current dose of loop diuretics.

2. Hypertension, controlled.

3. Anemia with no active bleeding noted.  Hemoglobin staying at 7.5 g/dL.

4. Hypomagnesemia, status post replacement.

5. Hypokalemia on initial admission, currently stable.



PLAN:

Check sodium today.  Maintain oral Lasix.  Avoid excessive free water intake. Agree

with increasing Flomax for urine retention, IV iron x 1 prior to discharge.





MMODL / IJN: [id number] / Job#: [id number]

## 2019-08-30 NOTE — P.PN
Subjective


Progress Note Date: 08/30/19


Principal diagnosis: 





Urinary retention





Patient was seen and examined.  No acute events overnight.  Urinary catheter 

pulled up around 11 AM yesterday.  Patient unable to urinate throughout the day.

 Bladder scan showed around 300 mL of postvoid residual.  Flomax increased to 

twice a day dosing.  Patient was able to urinate freely.  Patient denies any 

chest pain, shortness of breath or palpitations.  No nausea or vomiting.  No 

fever or chills.  He denies any abdominal pain or dysuria.





Objective





- Vital Signs


Vital signs: 


                                   Vital Signs











Temp  97.7 F   08/30/19 07:32


 


Pulse  84   08/30/19 07:35


 


Resp  20   08/30/19 07:32


 


BP  131/58   08/30/19 07:32


 


Pulse Ox  98   08/30/19 07:32








                                 Intake & Output











 08/29/19 08/30/19 08/30/19





 18:59 06:59 18:59


 


Intake Total 670 30 250


 


Output Total 500 350 


 


Balance 170 -320 250


 


Weight  79.9 kg 


 


Intake:   


 


  IV 30 30 10


 


    Invasive Line 1 30 30 10


 


  Oral 640  240


 


Output:   


 


  Urine 500 350 


 


Other:   


 


  Voiding Method Urinal Toilet 


 


  # Voids  1 














- Exam





General: [non toxic], [no distress on 2 L nasal cannula], [appears at stated 

age]


Derm: [warm], [dry]


Head: [atraumatic], [normocephalic], [symmetric]


Eyes: [EOMI], [no lid lag], [anicteric sclera]


Mouth: [no lip lesion], [mucus membranes moist]


Cardiovascular: [S1S2 reg], [no murmur], [Pleurx catheter inserted right lateral

 chest]


Lungs: [CTA bilateral], [decreased breath sounds on the right side] , [no 

accessory muscle use]


Abdominal: [soft], [ nontender to palpation], [no guarding], [no appreciable 

organomegaly]


Ext: [no gross muscle atrophy], [no edema], [no contractures]


Neuro:  [no focal neuro deficits]


Psych: [Alert], [oriented], [appropriate affect] 





- Labs


CBC & Chem 7: 


                                 08/30/19 06:32





                                 08/29/19 06:20


Labs: 


                  Abnormal Lab Results - Last 24 Hours (Table)











  08/29/19 08/30/19 Range/Units





  06:20 06:32 


 


RBC   2.56 L  (4.30-5.90)  m/uL


 


Hgb   7.5 L  (13.0-17.5)  gm/dL


 


Hct   23.3 L  (39.0-53.0)  %


 


Sodium  127 L   (137-145)  mmol/L


 


Chloride  93 L   ()  mmol/L


 


BUN  23 H   (9-20)  mg/dL


 


Glucose  116 H   (74-99)  mg/dL


 


Calcium  8.2 L   (8.4-10.2)  mg/dL














Assessment and Plan


Assessment: 





Urinary retention


Recurrent right-sided pleural effusion, loculated


Hypomagnesemia


Hyponatremia


Hypothyroidism





.  Able to urinate freely since last night.  Likely related to BPH.  

Plans: We'll switch back to Flomax daily.  Advised that he can go to twice a day

dosing if having difficulty urinating.  Patient states that he will follow with 

his PCP Dr. Jacob.


Pleurx catheter inserted, confirmed on chest x-ray.  Plans: Norco for pain 

management.  O2 per NC to maintain O2 saturation greater than 92%.  Follow 

pulmonology recommendations.


Magnesium 1.4 to 1.7.  Plans: Replace via protocol.


Sodium 127.  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  High urine osmolality along 

with urine sodium.  Plans: Continue normal saline at 50 mL per hour.  Follow 

nephrology recommendations.  Asymptomatic, repeat in 3 days.  Daily BMP.


Plans: Continue Synthroid.





[Discharge today.]

## 2020-02-19 ENCOUNTER — HOSPITAL ENCOUNTER (OUTPATIENT)
Dept: HOSPITAL 47 - RADUSWWP | Age: 85
Discharge: HOME | End: 2020-02-19
Attending: INTERNAL MEDICINE
Payer: MEDICARE

## 2020-02-19 DIAGNOSIS — J90: Primary | ICD-10-CM

## 2020-02-19 PROCEDURE — 76604 US EXAM CHEST: CPT

## 2020-06-08 ENCOUNTER — HOSPITAL ENCOUNTER (INPATIENT)
Dept: HOSPITAL 47 - EC | Age: 85
LOS: 2 days | Discharge: HOME HEALTH | DRG: 309 | End: 2020-06-10
Attending: FAMILY MEDICINE | Admitting: FAMILY MEDICINE
Payer: MEDICARE

## 2020-06-08 VITALS — BODY MASS INDEX: 19.8 KG/M2

## 2020-06-08 DIAGNOSIS — R26.2: ICD-10-CM

## 2020-06-08 DIAGNOSIS — J90: ICD-10-CM

## 2020-06-08 DIAGNOSIS — I42.9: ICD-10-CM

## 2020-06-08 DIAGNOSIS — Z92.21: ICD-10-CM

## 2020-06-08 DIAGNOSIS — M54.9: ICD-10-CM

## 2020-06-08 DIAGNOSIS — D50.9: ICD-10-CM

## 2020-06-08 DIAGNOSIS — Z80.51: ICD-10-CM

## 2020-06-08 DIAGNOSIS — I35.8: ICD-10-CM

## 2020-06-08 DIAGNOSIS — Z86.711: ICD-10-CM

## 2020-06-08 DIAGNOSIS — N17.9: ICD-10-CM

## 2020-06-08 DIAGNOSIS — Z98.890: ICD-10-CM

## 2020-06-08 DIAGNOSIS — K21.9: ICD-10-CM

## 2020-06-08 DIAGNOSIS — E03.9: ICD-10-CM

## 2020-06-08 DIAGNOSIS — Z90.89: ICD-10-CM

## 2020-06-08 DIAGNOSIS — K59.00: ICD-10-CM

## 2020-06-08 DIAGNOSIS — Z87.891: ICD-10-CM

## 2020-06-08 DIAGNOSIS — Z82.49: ICD-10-CM

## 2020-06-08 DIAGNOSIS — K57.90: ICD-10-CM

## 2020-06-08 DIAGNOSIS — D63.8: ICD-10-CM

## 2020-06-08 DIAGNOSIS — Z85.21: ICD-10-CM

## 2020-06-08 DIAGNOSIS — Z79.899: ICD-10-CM

## 2020-06-08 DIAGNOSIS — G89.29: ICD-10-CM

## 2020-06-08 DIAGNOSIS — I11.9: ICD-10-CM

## 2020-06-08 DIAGNOSIS — E44.0: ICD-10-CM

## 2020-06-08 DIAGNOSIS — Z77.090: ICD-10-CM

## 2020-06-08 DIAGNOSIS — R79.89: ICD-10-CM

## 2020-06-08 DIAGNOSIS — E78.5: ICD-10-CM

## 2020-06-08 DIAGNOSIS — Z66: ICD-10-CM

## 2020-06-08 DIAGNOSIS — I95.9: ICD-10-CM

## 2020-06-08 DIAGNOSIS — J44.9: ICD-10-CM

## 2020-06-08 DIAGNOSIS — Z87.39: ICD-10-CM

## 2020-06-08 DIAGNOSIS — M19.90: ICD-10-CM

## 2020-06-08 DIAGNOSIS — N40.0: ICD-10-CM

## 2020-06-08 DIAGNOSIS — I48.0: Primary | ICD-10-CM

## 2020-06-08 DIAGNOSIS — Z79.890: ICD-10-CM

## 2020-06-08 DIAGNOSIS — Z96.641: ICD-10-CM

## 2020-06-08 DIAGNOSIS — I25.10: ICD-10-CM

## 2020-06-08 DIAGNOSIS — Z20.828: ICD-10-CM

## 2020-06-08 DIAGNOSIS — Z92.3: ICD-10-CM

## 2020-06-08 DIAGNOSIS — Z83.6: ICD-10-CM

## 2020-06-08 LAB
ALBUMIN SERPL-MCNC: 3.2 G/DL (ref 3.5–5)
ALP SERPL-CCNC: 83 U/L (ref 38–126)
ALT SERPL-CCNC: 13 U/L (ref 4–49)
ANION GAP SERPL CALC-SCNC: 11 MMOL/L
APTT BLD: 24.4 SEC (ref 22–30)
AST SERPL-CCNC: 20 U/L (ref 17–59)
BASOPHILS # BLD AUTO: 0 K/UL (ref 0–0.2)
BASOPHILS NFR BLD AUTO: 0 %
BUN SERPL-SCNC: 44 MG/DL (ref 9–20)
CALCIUM SPEC-MCNC: 11.8 MG/DL (ref 8.4–10.2)
CHLORIDE SERPL-SCNC: 98 MMOL/L (ref 98–107)
CO2 SERPL-SCNC: 24 MMOL/L (ref 22–30)
EOSINOPHIL # BLD AUTO: 0 K/UL (ref 0–0.7)
EOSINOPHIL NFR BLD AUTO: 1 %
ERYTHROCYTE [DISTWIDTH] IN BLOOD BY AUTOMATED COUNT: 3.15 M/UL (ref 4.3–5.9)
ERYTHROCYTE [DISTWIDTH] IN BLOOD: 14.6 % (ref 11.5–15.5)
GLUCOSE SERPL-MCNC: 122 MG/DL (ref 74–99)
HCT VFR BLD AUTO: 27.9 % (ref 39–53)
HGB BLD-MCNC: 8.6 GM/DL (ref 13–17.5)
INR PPP: 1 (ref ?–1.2)
LYMPHOCYTES # SPEC AUTO: 0.9 K/UL (ref 1–4.8)
LYMPHOCYTES NFR SPEC AUTO: 12 %
MAGNESIUM SPEC-SCNC: 1.6 MG/DL (ref 1.6–2.3)
MCH RBC QN AUTO: 27.3 PG (ref 25–35)
MCHC RBC AUTO-ENTMCNC: 30.9 G/DL (ref 31–37)
MCV RBC AUTO: 88.4 FL (ref 80–100)
MONOCYTES # BLD AUTO: 0.5 K/UL (ref 0–1)
MONOCYTES NFR BLD AUTO: 6 %
NEUTROPHILS # BLD AUTO: 6.1 K/UL (ref 1.3–7.7)
NEUTROPHILS NFR BLD AUTO: 79 %
PLATELET # BLD AUTO: 325 K/UL (ref 150–450)
POTASSIUM SERPL-SCNC: 4.8 MMOL/L (ref 3.5–5.1)
PROT SERPL-MCNC: 6.5 G/DL (ref 6.3–8.2)
PT BLD: 10.1 SEC (ref 9–12)
SODIUM SERPL-SCNC: 133 MMOL/L (ref 137–145)
WBC # BLD AUTO: 7.7 K/UL (ref 3.8–10.6)

## 2020-06-08 PROCEDURE — 83880 ASSAY OF NATRIURETIC PEPTIDE: CPT

## 2020-06-08 PROCEDURE — 84484 ASSAY OF TROPONIN QUANT: CPT

## 2020-06-08 PROCEDURE — 82272 OCCULT BLD FECES 1-3 TESTS: CPT

## 2020-06-08 PROCEDURE — 83540 ASSAY OF IRON: CPT

## 2020-06-08 PROCEDURE — 85025 COMPLETE CBC W/AUTO DIFF WBC: CPT

## 2020-06-08 PROCEDURE — 84443 ASSAY THYROID STIM HORMONE: CPT

## 2020-06-08 PROCEDURE — 83605 ASSAY OF LACTIC ACID: CPT

## 2020-06-08 PROCEDURE — 83550 IRON BINDING TEST: CPT

## 2020-06-08 PROCEDURE — 81003 URINALYSIS AUTO W/O SCOPE: CPT

## 2020-06-08 PROCEDURE — 93005 ELECTROCARDIOGRAM TRACING: CPT

## 2020-06-08 PROCEDURE — 76604 US EXAM CHEST: CPT

## 2020-06-08 PROCEDURE — 82728 ASSAY OF FERRITIN: CPT

## 2020-06-08 PROCEDURE — 36415 COLL VENOUS BLD VENIPUNCTURE: CPT

## 2020-06-08 PROCEDURE — 85730 THROMBOPLASTIN TIME PARTIAL: CPT

## 2020-06-08 PROCEDURE — 80048 BASIC METABOLIC PNL TOTAL CA: CPT

## 2020-06-08 PROCEDURE — 99285 EMERGENCY DEPT VISIT HI MDM: CPT

## 2020-06-08 PROCEDURE — 85610 PROTHROMBIN TIME: CPT

## 2020-06-08 PROCEDURE — 80061 LIPID PANEL: CPT

## 2020-06-08 PROCEDURE — 83735 ASSAY OF MAGNESIUM: CPT

## 2020-06-08 PROCEDURE — 84439 ASSAY OF FREE THYROXINE: CPT

## 2020-06-08 PROCEDURE — 93306 TTE W/DOPPLER COMPLETE: CPT

## 2020-06-08 PROCEDURE — 71046 X-RAY EXAM CHEST 2 VIEWS: CPT

## 2020-06-08 PROCEDURE — 80053 COMPREHEN METABOLIC PANEL: CPT

## 2020-06-08 RX ADMIN — TAMSULOSIN HYDROCHLORIDE SCH MG: 0.4 CAPSULE ORAL at 19:59

## 2020-06-08 RX ADMIN — MIRTAZAPINE SCH MG: 15 TABLET, FILM COATED ORAL at 19:59

## 2020-06-08 RX ADMIN — FUROSEMIDE SCH MG: 10 INJECTION, SOLUTION INTRAMUSCULAR; INTRAVENOUS at 19:59

## 2020-06-08 RX ADMIN — ACETAMINOPHEN AND CODEINE PHOSPHATE PRN EACH: 300; 30 TABLET ORAL at 20:00

## 2020-06-08 RX ADMIN — METOPROLOL TARTRATE SCH MG: 25 TABLET, FILM COATED ORAL at 20:00

## 2020-06-08 NOTE — P.HPIM
History of Present Illness


H&P Date: 06/08/20


Chief Complaint: A. fib, generalized weakness





87-year-old male with PMH of laryngeal cancer, hypertension, hypothyroidism, BPH

presents to the ED for generalized weakness.  Patient reports typically being 

able to ambulate with a walker.  Today, he was unable to stand up.  His 

generalized weakness has been getting worse over the past 3 days.  Patient 

denies any focal neurologic deficits.  Patient also reports a decreased appetite

and subjective 30 pound weight loss over the past 3 months.  He does have a 

history of laryngeal cancer and follows Dr. Barnes.  He has a history of 

smoking 40 years ago.  Patient also reports some swelling in his bilateral 

ankles.  Patient reports constipation, last bowel movement 4 days ago.  He 

denies any headache, nausea or vomiting, fever or chills, cough, chest pain, 

shortness of breath, palpitations, changes in urination.  He denies any 

dizziness, numbness/weakness/tingling of the extremities.  In the ED, vital 

signs were stable except for O2 saturation of 92% on room air.  EKG showed 

atrial fibrillation with RVR ventricular rate of 114.  Chest x-ray showed 

pleural fluid and thickening right lower lobe, slightly worsened.  CBC showed 

hemoglobin of 8.6.  INR was within normal limits.  CMP showed sodium of 133, B1 

of 44, creatinine 1.34, calcium 11.8.  Troponin was 0.074.  BNP was 5050.  Stool

for occult blood was negative.  Patient is admitted for generalized weakness, A.

fib with RVR, anemia and pleural effusion with pulmonology and cardiology on 

consultation.





Review of Systems





Pertinent positives and negatives as discussed in HPI, a complete review of 

systems was performed and all other systems are negative.





Past Medical History


Past Medical History: Cancer, GERD/Reflux, Hyperlipidemia, Hypertension, Osteoar

thritis (OA), Pulmonary Embolus (PE), Thyroid Disorder


Additional Past Medical History / Comment(s): Laryngeal cancer RADIATION 

CHEMOTHERAPY completed.  Hypothyroidism.  BPH.  GERD.  Coronary artery disease. 

Anemia.  Gout.  Diverticulosis.


History of Any Multi-Drug Resistant Organisms: None Reported


Past Surgical History: Orthopedic Surgery, Tonsillectomy


Additional Past Surgical History / Comment(s): RIGHT HIP REPLACEMENT.  trach


Past Anesthesia/Blood Transfusion Reactions: No Reported Reaction


Additional Past Anesthesia/Blood Transfusion Reaction / Comment(s): HAD 2 UNITS 

DURING CHEMO AT [facility]


Past Psychological History: No Psychological Hx Reported


Smoking Status: Former smoker


Past Alcohol Use History: Daily


Past Drug Use History: None Reported





- Past Family History


  ** Father


Family Medical History: Cancer


Additional Family Medical History / Comment(s): KIDNEY CANCER





  ** Mother


Family Medical History: Myocardial Infarction (MI), Respiratory Disorder





Medications and Allergies


                                Home Medications











 Medication  Instructions  Recorded  Confirmed  Type


 


Multivitamin/Iron/Folic Acid 1 tab PO DAILY@1200 02/12/18 06/08/20 History





[Centrum Complete Multivit Tab]    


 


Omeprazole [PriLOSEC] 20 mg PO DAILY@1200 02/12/18 06/08/20 History


 


Tamsulosin HCl [Flomax] 0.4 mg PO HS 02/12/18 06/08/20 History


 


Ferrous Sulfate [Iron (65  mg PO DAILY 08/15/19 06/08/20 History





Elemental)]    


 


Furosemide [Lasix] 20 mg PO DAILY 08/20/19 06/08/20 History


 


Potassium Chloride [Klor-Con 10] 10 meq PO DAILY 08/20/19 06/08/20 History


 


Acetaminophen-Codeine 300-30mg 1 tab PO  06/08/20 06/08/20 History





[Tylenol w/codeine #3]    


 


Ibuprofen [Motrin Ib] 200 - 800 mg PO BID PRN 06/08/20 06/08/20 History


 


Levothyroxine Sodium [Synthroid] 25 mcg PO DAILY 06/08/20 06/08/20 History


 


Magnesium Citrate 250mg 250 mg PO DAILY 06/08/20 06/08/20 History


 


Mirtazapine [Remeron] 7.5 mg PO  06/08/20 06/08/20 History


 


Sennosides [Senokot] 17.2 mg PO HS 06/08/20 06/08/20 History


 


Stool Softener 250mg 250 mg PO  06/08/20 06/08/20 History








                                    Allergies











Allergy/AdvReac Type Severity Reaction Status Date / Time


 


No Known Allergies Allergy   Verified 06/08/20 16:55














Physical Exam


Vitals: 


                                   Vital Signs











  Temp Pulse Resp BP Pulse Ox


 


 06/08/20 17:47   110 H  20  126/83  97


 


 06/08/20 17:11   110 H  20  126/83  97


 


 06/08/20 16:11    20  


 


 06/08/20 15:11    20  


 


 06/08/20 15:08  97.5 F L  62  18  111/68  92 L








                                Intake and Output











 06/08/20 06/08/20 06/08/20





 06:59 14:59 22:59


 


Other:   


 


  Weight   64.093 kg














General: [non toxic], [no distress], [appears at stated age]


Derm: [warm], [dry]


Head: [atraumatic], [normocephalic], [symmetric]


Eyes: [EOMI], [no lid lag], [anicteric sclera]


Mouth: [no lip lesion], [mucus membranes moist]


Cardiovascular: [S1S2 irregular], [no murmur], [positive DP pulse bilateral], 


Lungs: [Decreased breath sounds bilateral], [no rhonchi, no rales] , [no 

accessory muscle use]


Abdominal: [soft], [ nontender to palpation], [no guarding], [no appreciable 

organomegaly]


Ext: [no gross muscle atrophy], [no edema], [no contractures]


Neuro: [ CN II-XI grossly intact], [no focal neuro deficits]


Psych: [Alert], [oriented], [appropriate affect] 





Results


CBC & Chem 7: 


                                 06/08/20 16:05





                                 06/08/20 16:05


Labs: 


                  Abnormal Lab Results - Last 24 Hours (Table)











  06/08/20 06/08/20 06/08/20 Range/Units





  16:05 16:05 16:05 


 


RBC  3.15 L    (4.30-5.90)  m/uL


 


Hgb  8.6 L D    (13.0-17.5)  gm/dL


 


Hct  27.9 L    (39.0-53.0)  %


 


MCHC  30.9 L    (31.0-37.0)  g/dL


 


Lymphocytes #  0.9 L    (1.0-4.8)  k/uL


 


Sodium   133 L   (137-145)  mmol/L


 


BUN   44 H   (9-20)  mg/dL


 


Creatinine   1.34 H   (0.66-1.25)  mg/dL


 


Glucose   122 H   (74-99)  mg/dL


 


Calcium   11.8 H   (8.4-10.2)  mg/dL


 


Troponin I    0.074 H*  (0.000-0.034)  ng/mL


 


Albumin   3.2 L   (3.5-5.0)  g/dL














Assessment and Plan


Assessment: 





Generalized weakness


Atrial fibrillation with RVR


Elevated troponin


Anemia


Pleural effusion


Elevated creatinine


Hypothyroidism


History of laryngeal cancer


Poor appetite with low BMI of 20





Patient's generalized weakness is multifactorial (A. fib with RVR, worsening 

pleural effusion, worsening anemia).  Patient will be placed on fall precautions

and PT and OT will be consulted for medical further management.





As seen on EKG.  Rate of 114.  We will place the patient on telemetry 

monitoring.  Cardiology would be consulted for further management of this 

patient.  Echocardiogram has been ordered.  I will hold off on any rate control 

given his marginal blood pressure and hold off on anticoagulation due to his 

worsening anemia.





Patient has elevated troponin of 0.074.  This could be related to demand 

ischemia from atrial fibrillation.  Plans to trend troponin/EKG to rule out ACS.





Hemoglobin 8.6, MCV 88.4.  His hemoglobin on 05/04/2020 was 10.3.  Stool for 

occult blood is negative.  We will order iron studies.  Plans to repeat CBC 

tomorrow morning.  Plans to transfuse if hemoglobin less than 7.





Chest x-ray showing worsening pleural effusion.  Previously evaluated and 

thoracentesis performed by pulmonology.  We'll start Lasix 20 mg IV twice a day.

 Patient will be given supplemental O2 per NC to maintain O2 saturation greater 

than 92%.  We will consult pulmonology for further management of this patient.





Creatinine is elevated at 1.34.  This could be a component of chronic kidney dis

ease.  Plans to avoid nephrotoxins and repeat BMP tomorrow morning.





Patient's home dose of Synthroid will be restarted for hypothyroidism.





Patient will need to follow-up with ENT Dr. Barnes in the outpatient 

setting.





Patient reports a poor appetite and subjective weight loss.  Dietitian will be 

consulted.





DVT prophylaxis: [SCD boots]


Discussed with: [Patient and wife]


Anticipated discharge: [2-3 days]


Anticipated discharge place: [Home]


A total of [45] minutes was spent on the care of this complex patient more than 

50% of the time was spent in counseling and care coordination. 





Patient names his wife Marni decision-maker if he cant make decisions for 

himself. Patient would like to be NO CODE but is open to being intubated if 

necessary. This was discussed with his wife at beside.

## 2020-06-08 NOTE — XR
EXAMINATION TYPE: XR chest 2V

 

DATE OF EXAM: 6/8/2020

 

COMPARISON: 2/12/2020

 

HISTORY: Weakness

 

TECHNIQUE:

 

FINDINGS: There is moderate right pleural effusion with consolidation right lower lobe. There is smal
ler left pleural effusion. There is no heart failure. There is pleural thickening along the right lat
eral chest wall. Thoracic aorta is atheromatous.

 

IMPRESSION: Pleural fluid and thickening right lower lobe and right lateral chest wall unchanged. Rig
ht pleural effusion slightly worse than old exam. No gross heart failure seen. Pulmonary vascularity 
increased slightly compared to old exam. Left pleural effusion is new compared to old exam.

## 2020-06-08 NOTE — ED
Weakness HPI





- General


Chief complaint: Weakness


Stated complaint: weakness


Time Seen by Provider: 06/08/20 15:14


Source: patient, family


Mode of arrival: wheelchair


Limitations: no limitations





- History of Present Illness


Initial comments: 





Patient is an 87-year-old male, with history of laryngeal cancer, presenting to 

the emergency Department with complaints of weakness and has been getting worse 

over the past 3 months.  Wife is helping with history and states that today he 

was having a hard time walking secondary to his legs being extremely weak and 

his blood pressure has been low at home so she decided to bring him into ER.  

Patient denies any fever, chills, chest pains.  He denies any recent falls or 

injuries.  He does admit to shortness of breath with exertion as well as 

decreased appetite and general overall weakness.  Denies being on a blood 

thinner. He normally walks with a cane during the day and a walker at night.  He

does not use home O2.  Wife states that patient takes Tylenol #3's for chronic 

back pain and to take 1 today, thus why he's been sleeping throughout today.  He

denies any nausea, vomiting, abdominal pain, diarrhea.  There are no other 

complaints at this time.





- Related Data


                                Home Medications











 Medication  Instructions  Recorded  Confirmed


 


Multivitamin/Iron/Folic Acid 1 tab PO DAILY 02/12/18 08/25/19





[Centrum Complete Multivit Tab]   


 


Omeprazole [PriLOSEC] 20 mg PO DAILY 02/12/18 08/25/19


 


Tamsulosin HCl [Flomax] 0.4 mg PO HS 02/12/18 08/25/19


 


Ferrous Sulfate [Iron (65  mg PO DAILY 08/15/19 08/25/19





Elemental)]   


 


Furosemide [Lasix] 20 mg PO DAILY 08/20/19 08/25/19


 


Potassium Chloride [Klor-Con 10] 10 meq PO DAILY 08/20/19 08/25/19


 


Acetaminophen-Codeine 300-30mg 1 tab PO HS 06/08/20 06/08/20





[Tylenol w/codeine #3]   


 


Ibuprofen [Motrin Ib] 200 - 800 mg PO BID PRN 06/08/20 06/08/20


 


Levothyroxine Sodium [Synthroid] 25 mcg PO DAILY 06/08/20 06/08/20


 


Magnesium Citrate 250mg 250 mg PO DAILY 06/08/20 06/08/20


 


Mirtazapine [Remeron] 7.5 mg PO HS 06/08/20 06/08/20


 


Sennosides [Senokot] 17.2 mg PO HS 06/08/20 06/08/20


 


Stool Softener 250mg 250 mg PO HS 06/08/20 06/08/20











                                    Allergies











Allergy/AdvReac Type Severity Reaction Status Date / Time


 


No Known Allergies Allergy   Verified 06/08/20 16:55














Review of Systems


ROS Statement: 


Those systems with pertinent positive or pertinent negative responses have been 

documented in the HPI.





ROS Other: All systems not noted in ROS Statement are negative.





Past Medical History


Past Medical History: Cancer, GERD/Reflux, Hyperlipidemia, Hypertension, 

Osteoarthritis (OA), Pulmonary Embolus (PE), Thyroid Disorder


Additional Past Medical History / Comment(s): Laryngeal cancer RADIATION 

CHEMOTHERAPY completed.  Hypothyroidism.  BPH.  GERD.  Coronary artery disease. 

 Anemia.  Gout.  Diverticulosis.


History of Any Multi-Drug Resistant Organisms: None Reported


Past Surgical History: Orthopedic Surgery, Tonsillectomy


Additional Past Surgical History / Comment(s): RIGHT HIP REPLACEMENT.  trach


Past Anesthesia/Blood Transfusion Reactions: No Reported Reaction


Additional Past Anesthesia/Blood Transfusion Reaction / Comment(s): HAD 2 UNITS 

DURING CHEMO AT [facility]


Past Psychological History: No Psychological Hx Reported


Smoking Status: Former smoker


Past Alcohol Use History: Daily


Past Drug Use History: None Reported





- Past Family History


  ** Father


Family Medical History: Cancer


Additional Family Medical History / Comment(s): KIDNEY CANCER





  ** Mother


Family Medical History: Myocardial Infarction (MI), Respiratory Disorder





General Exam





- General Exam Comments


Initial Comments: 





GENERAL: 


Patient looks fatigued, in no acute distress.





HEAD: 


Atraumatic, normocephalic.





EYES:


Pupils equal round and reactive to light, extraocular movements intact, sclera 

anicteric, conjunctiva are normal.  Patient has bilateral yellow drainage





ENT: 


TMs normal, nares patent, oropharynx clear without exudates.  Moist mucous 

membranes.





NECK: 


Normal range of motion, supple without lymphadenopathy or JVD.





LUNGS:


 Diminished lower fields bilaterally.  No wheezes rales or rhonchi.





HEART:


Regular rate and rhythm without murmurs, rubs or gallops.





ABDOMEN: 


Soft, nontender, normoactive bowel sounds.  No guarding, no rebound.  No masses 

appreciated.





: Deferred 





EXTREMITIES: 


Normal range of motion, no pitting or edema.  No clubbing or cyanosis.





NEUROLOGICAL: 


Cranial nerves II through XII grossly intact.  Normal speech.





PSYCH:


Normal mood, normal affect.





SKIN:


 Warm, Dry, normal turgor, no rashes or lesions noted.


Limitations: no limitations





Course


                                   Vital Signs











  06/08/20 06/08/20 06/08/20





  15:08 15:11 16:11


 


Temperature 97.5 F L  


 


Pulse Rate 62  


 


Respiratory 18 20 20





Rate   


 


Blood Pressure 111/68  


 


O2 Sat by Pulse 92 L  





Oximetry   














  06/08/20 06/08/20





  17:11 17:47


 


Temperature  


 


Pulse Rate 110 H 110 H


 


Respiratory 20 20





Rate  


 


Blood Pressure 126/83 126/83


 


O2 Sat by Pulse 97 97





Oximetry  














EKG Findings





- EKG Comments:


EKG Findings:: Atrial fibrillation with RVR, no signs of acute ischemia.  

Ventricular rate 114, QRS duration 84, , .





Medical Decision Making





- Medical Decision Making





Patient is an 87-year-old male here for weakness has been increasing over the 

past 3 months.  Denies any pain including no chest pain.  Decreased appetite, 

dyspnea with exertion.  92% on room air upon arrival, HR normal, although when 

EKG was performed that showed atrial fibrillation with RVR.  Lab work shows 

decreased hemoglobin from baseline 8.6.  Lactic acid normal, kidney function is 

at baseline.  Troponin is elevated at 0.074.  BNP is 5050.  Chest x-ray shows 

right pleural effusion slightly worse then previous exam, a new left small 

pleural effusion.  Patient will be admitted and consult with cardiology and 

pulmonology.  Patient was accepted by Dr. Mora.  Case discussed with Dr. Burns.  





- Lab Data


Result diagrams: 


                                 06/08/20 16:05





                                 06/08/20 16:05


                                   Lab Results











  06/08/20 06/08/20 06/08/20 Range/Units





  16:05 16:05 16:05 


 


WBC  7.7    (3.8-10.6)  k/uL


 


RBC  3.15 L    (4.30-5.90)  m/uL


 


Hgb  8.6 L D    (13.0-17.5)  gm/dL


 


Hct  27.9 L    (39.0-53.0)  %


 


MCV  88.4    (80.0-100.0)  fL


 


MCH  27.3    (25.0-35.0)  pg


 


MCHC  30.9 L    (31.0-37.0)  g/dL


 


RDW  14.6    (11.5-15.5)  %


 


Plt Count  325    (150-450)  k/uL


 


Neutrophils %  79    %


 


Lymphocytes %  12    %


 


Monocytes %  6    %


 


Eosinophils %  1    %


 


Basophils %  0    %


 


Neutrophils #  6.1    (1.3-7.7)  k/uL


 


Lymphocytes #  0.9 L    (1.0-4.8)  k/uL


 


Monocytes #  0.5    (0-1.0)  k/uL


 


Eosinophils #  0.0    (0-0.7)  k/uL


 


Basophils #  0.0    (0-0.2)  k/uL


 


PT   10.1   (9.0-12.0)  sec


 


INR   1.0   (<1.2)  


 


APTT   24.4   (22.0-30.0)  sec


 


Sodium    133 L  (137-145)  mmol/L


 


Potassium    4.8  (3.5-5.1)  mmol/L


 


Chloride    98  ()  mmol/L


 


Carbon Dioxide    24  (22-30)  mmol/L


 


Anion Gap    11  mmol/L


 


BUN    44 H  (9-20)  mg/dL


 


Creatinine    1.34 H  (0.66-1.25)  mg/dL


 


Est GFR (CKD-EPI)AfAm    55  (>60 ml/min/1.73 sqM)  


 


Est GFR (CKD-EPI)NonAf    48  (>60 ml/min/1.73 sqM)  


 


Glucose    122 H  (74-99)  mg/dL


 


Plasma Lactic Acid Leeroy     (0.7-2.0)  mmol/L


 


Calcium    11.8 H  (8.4-10.2)  mg/dL


 


Magnesium    1.6  (1.6-2.3)  mg/dL


 


Total Bilirubin    0.3  (0.2-1.3)  mg/dL


 


AST    20  (17-59)  U/L


 


ALT    13  (4-49)  U/L


 


Alkaline Phosphatase    83  ()  U/L


 


Troponin I     (0.000-0.034)  ng/mL


 


NT-Pro-B Natriuret Pep     pg/mL


 


Total Protein    6.5  (6.3-8.2)  g/dL


 


Albumin    3.2 L  (3.5-5.0)  g/dL


 


Stool Occult Blood     (Negative)  














  06/08/20 06/08/20 06/08/20 Range/Units





  16:05 16:05 16:05 


 


WBC     (3.8-10.6)  k/uL


 


RBC     (4.30-5.90)  m/uL


 


Hgb     (13.0-17.5)  gm/dL


 


Hct     (39.0-53.0)  %


 


MCV     (80.0-100.0)  fL


 


MCH     (25.0-35.0)  pg


 


MCHC     (31.0-37.0)  g/dL


 


RDW     (11.5-15.5)  %


 


Plt Count     (150-450)  k/uL


 


Neutrophils %     %


 


Lymphocytes %     %


 


Monocytes %     %


 


Eosinophils %     %


 


Basophils %     %


 


Neutrophils #     (1.3-7.7)  k/uL


 


Lymphocytes #     (1.0-4.8)  k/uL


 


Monocytes #     (0-1.0)  k/uL


 


Eosinophils #     (0-0.7)  k/uL


 


Basophils #     (0-0.2)  k/uL


 


PT     (9.0-12.0)  sec


 


INR     (<1.2)  


 


APTT     (22.0-30.0)  sec


 


Sodium     (137-145)  mmol/L


 


Potassium     (3.5-5.1)  mmol/L


 


Chloride     ()  mmol/L


 


Carbon Dioxide     (22-30)  mmol/L


 


Anion Gap     mmol/L


 


BUN     (9-20)  mg/dL


 


Creatinine     (0.66-1.25)  mg/dL


 


Est GFR (CKD-EPI)AfAm     (>60 ml/min/1.73 sqM)  


 


Est GFR (CKD-EPI)NonAf     (>60 ml/min/1.73 sqM)  


 


Glucose     (74-99)  mg/dL


 


Plasma Lactic Acid Leeroy  0.9    (0.7-2.0)  mmol/L


 


Calcium     (8.4-10.2)  mg/dL


 


Magnesium     (1.6-2.3)  mg/dL


 


Total Bilirubin     (0.2-1.3)  mg/dL


 


AST     (17-59)  U/L


 


ALT     (4-49)  U/L


 


Alkaline Phosphatase     ()  U/L


 


Troponin I   0.074 H*   (0.000-0.034)  ng/mL


 


NT-Pro-B Natriuret Pep    5050  pg/mL


 


Total Protein     (6.3-8.2)  g/dL


 


Albumin     (3.5-5.0)  g/dL


 


Stool Occult Blood     (Negative)  














  06/08/20 Range/Units





  17:36 


 


WBC   (3.8-10.6)  k/uL


 


RBC   (4.30-5.90)  m/uL


 


Hgb   (13.0-17.5)  gm/dL


 


Hct   (39.0-53.0)  %


 


MCV   (80.0-100.0)  fL


 


MCH   (25.0-35.0)  pg


 


MCHC   (31.0-37.0)  g/dL


 


RDW   (11.5-15.5)  %


 


Plt Count   (150-450)  k/uL


 


Neutrophils %   %


 


Lymphocytes %   %


 


Monocytes %   %


 


Eosinophils %   %


 


Basophils %   %


 


Neutrophils #   (1.3-7.7)  k/uL


 


Lymphocytes #   (1.0-4.8)  k/uL


 


Monocytes #   (0-1.0)  k/uL


 


Eosinophils #   (0-0.7)  k/uL


 


Basophils #   (0-0.2)  k/uL


 


PT   (9.0-12.0)  sec


 


INR   (<1.2)  


 


APTT   (22.0-30.0)  sec


 


Sodium   (137-145)  mmol/L


 


Potassium   (3.5-5.1)  mmol/L


 


Chloride   ()  mmol/L


 


Carbon Dioxide   (22-30)  mmol/L


 


Anion Gap   mmol/L


 


BUN   (9-20)  mg/dL


 


Creatinine   (0.66-1.25)  mg/dL


 


Est GFR (CKD-EPI)AfAm   (>60 ml/min/1.73 sqM)  


 


Est GFR (CKD-EPI)NonAf   (>60 ml/min/1.73 sqM)  


 


Glucose   (74-99)  mg/dL


 


Plasma Lactic Acid Leeroy   (0.7-2.0)  mmol/L


 


Calcium   (8.4-10.2)  mg/dL


 


Magnesium   (1.6-2.3)  mg/dL


 


Total Bilirubin   (0.2-1.3)  mg/dL


 


AST   (17-59)  U/L


 


ALT   (4-49)  U/L


 


Alkaline Phosphatase   ()  U/L


 


Troponin I   (0.000-0.034)  ng/mL


 


NT-Pro-B Natriuret Pep   pg/mL


 


Total Protein   (6.3-8.2)  g/dL


 


Albumin   (3.5-5.0)  g/dL


 


Stool Occult Blood  Negative  (Negative)  














Disposition


Clinical Impression: 


 Weakness, Atrial fibrillation with RVR, Elevated troponin, Pleural effusion





Disposition: ADMITTED AS IP TO THIS HOSP


Condition: Good


Referrals: 


Fermin Jacob MD [Primary Care Provider] - 1-2 days


Decision Date: 06/08/20


Decision Time: 17:24

## 2020-06-09 LAB
ALBUMIN SERPL-MCNC: 2.9 G/DL (ref 3.5–5)
ALP SERPL-CCNC: 77 U/L (ref 38–126)
ALT SERPL-CCNC: 12 U/L (ref 4–49)
ANION GAP SERPL CALC-SCNC: 7 MMOL/L
AST SERPL-CCNC: 19 U/L (ref 17–59)
BASOPHILS # BLD AUTO: 0 K/UL (ref 0–0.2)
BASOPHILS NFR BLD AUTO: 0 %
BUN SERPL-SCNC: 41 MG/DL (ref 9–20)
CALCIUM SPEC-MCNC: 11.3 MG/DL (ref 8.4–10.2)
CHLORIDE SERPL-SCNC: 99 MMOL/L (ref 98–107)
CHOLEST SERPL-MCNC: 125 MG/DL (ref ?–200)
CO2 SERPL-SCNC: 26 MMOL/L (ref 22–30)
EOSINOPHIL # BLD AUTO: 0 K/UL (ref 0–0.7)
EOSINOPHIL NFR BLD AUTO: 1 %
ERYTHROCYTE [DISTWIDTH] IN BLOOD BY AUTOMATED COUNT: 2.87 M/UL (ref 4.3–5.9)
ERYTHROCYTE [DISTWIDTH] IN BLOOD: 14.6 % (ref 11.5–15.5)
FERRITIN SERPL-MCNC: 433.8 NG/ML (ref 22–322)
GLUCOSE SERPL-MCNC: 95 MG/DL (ref 74–99)
HCT VFR BLD AUTO: 25.7 % (ref 39–53)
HDLC SERPL-MCNC: 38 MG/DL (ref 40–60)
HGB BLD-MCNC: 8 GM/DL (ref 13–17.5)
INR PPP: 1 (ref ?–1.2)
IRON SERPL-MCNC: 27 UG/DL (ref 65–175)
LDLC SERPL CALC-MCNC: 73 MG/DL (ref 0–99)
LYMPHOCYTES # SPEC AUTO: 0.9 K/UL (ref 1–4.8)
LYMPHOCYTES NFR SPEC AUTO: 14 %
MCH RBC QN AUTO: 27.7 PG (ref 25–35)
MCHC RBC AUTO-ENTMCNC: 30.9 G/DL (ref 31–37)
MCV RBC AUTO: 89.7 FL (ref 80–100)
MONOCYTES # BLD AUTO: 0.3 K/UL (ref 0–1)
MONOCYTES NFR BLD AUTO: 6 %
NEUTROPHILS # BLD AUTO: 4.9 K/UL (ref 1.3–7.7)
NEUTROPHILS NFR BLD AUTO: 78 %
PH UR: 5 [PH] (ref 5–8)
PLATELET # BLD AUTO: 259 K/UL (ref 150–450)
POTASSIUM SERPL-SCNC: 4.5 MMOL/L (ref 3.5–5.1)
PROT SERPL-MCNC: 6 G/DL (ref 6.3–8.2)
PT BLD: 10.6 SEC (ref 9–12)
SODIUM SERPL-SCNC: 132 MMOL/L (ref 137–145)
SP GR UR: 1.02 (ref 1–1.03)
T4 FREE SERPL-MCNC: 1.41 NG/DL (ref 0.78–2.19)
TIBC SERPL-MCNC: 205 UG/DL (ref 228–460)
TRIGL SERPL-MCNC: 71 MG/DL (ref ?–150)
UROBILINOGEN UR QL STRIP: <2 MG/DL (ref ?–2)
WBC # BLD AUTO: 6.2 K/UL (ref 3.8–10.6)

## 2020-06-09 RX ADMIN — TAMSULOSIN HYDROCHLORIDE SCH MG: 0.4 CAPSULE ORAL at 19:52

## 2020-06-09 RX ADMIN — LEVOTHYROXINE SODIUM SCH MCG: 25 TABLET ORAL at 06:03

## 2020-06-09 RX ADMIN — METOPROLOL TARTRATE SCH MG: 25 TABLET, FILM COATED ORAL at 12:13

## 2020-06-09 RX ADMIN — METOPROLOL TARTRATE SCH MG: 25 TABLET, FILM COATED ORAL at 19:52

## 2020-06-09 RX ADMIN — ASPIRIN 81 MG CHEWABLE TABLET SCH MG: 81 TABLET CHEWABLE at 12:13

## 2020-06-09 RX ADMIN — METOPROLOL TARTRATE SCH: 25 TABLET, FILM COATED ORAL at 11:02

## 2020-06-09 RX ADMIN — FUROSEMIDE SCH MG: 10 INJECTION, SOLUTION INTRAMUSCULAR; INTRAVENOUS at 09:26

## 2020-06-09 RX ADMIN — MIRTAZAPINE SCH MG: 15 TABLET, FILM COATED ORAL at 19:52

## 2020-06-09 RX ADMIN — ACETAMINOPHEN AND CODEINE PHOSPHATE PRN EACH: 300; 30 TABLET ORAL at 19:52

## 2020-06-09 RX ADMIN — FUROSEMIDE SCH MG: 10 INJECTION, SOLUTION INTRAMUSCULAR; INTRAVENOUS at 19:53

## 2020-06-09 RX ADMIN — PANTOPRAZOLE SODIUM SCH MG: 40 TABLET, DELAYED RELEASE ORAL at 09:26

## 2020-06-09 NOTE — P.CNPUL
History of Present Illness


Consult date: 06/09/20


Requesting physician: Viviana Mora


Reason for consult: pleural effusion


Chief complaint: Weakness, hypertension, chronic pleural effusion


History of present illness: 


 This is a 87-year-old patient of Dr. Jacob with past medical history of 

laryngeal cancer post chemoradiation, hypertension, hyperlipidemia, 

osteoarthritis, previous history of pulmonary embolism, coronary artery disease,

chronic anemia, former smoker, diverticulosis, that follows with Dr. Murillo 

and Dr. Orr in the pulmonary clinic, and had previous thoracentesis for right-

sided pleural effusion.  The sampling was negative for microbial growth and 

negative cytologically.  This was on 07/11/2019.  Patient then developed 

recurrent right-sided pleural effusion and had to have a repeat thoracentesis on

07/25/2019 and again cultures and cytology were negative however the fluid was 

clearly exudative with an elevated LDH and elevated protein.  He had another 

repeat thoracentesis in August 2019 by interventional radiology, and the fluid 

was not sent for analysis.  Bronchoscopy and multiple biopsies and brush of the 

right lower lobe was done by Dr. Martines on August 16, with bronchial lavage was

negative, and biopsies and brush cytologies were negative.  Patient was then 

referred to Dr. Hills for recurrent right-sided pleural effusion and Pleurx 

catheter was placed on 08/28/2019, and the pleural fluid drainage had 

significantly slowed down and was eventually removed.  Patient does have severe 

COPD and his baseline FEV1 is 0.9 L or 35% of predicted.  His last CT scan of 

the chest was done in July 2019 showing some pleural plaquing with the 

possibility of previous exposure to asbestos.  Patient did refuse a PET scan 

ordered a follow-up visit on 02/18/2020, because he did not feel it was 

necessary.  The plan of care was discussed with the patient in the office, and 

related to his multiple comorbidities, poor lung function, patient would likely 

be a poor candidate for any treatment and patient when his symptomatic relief 

from his shortness of breath.  On 06 awake 2020 patient presents to the emergenc

y department with complaints of weakness, but has been worsening over last 3 

months.  He has been having increasingly hard time walking secondary to his legs

being extremely weak, and patient hypotensive with blood pressure in the 80s.  

Denies any fever, denied any chills or chest pains, no injuries, no nausea 

vomiting or diarrhea.  As been experiencing worsening shortness of breath 

decreased appetite and generalized weakness.  His been increasingly sleeping 

more through the day.  His chest x-ray showed moderately sized right-sided 

pleural effusion and consolidation of the right lower lobe which is unchanged 

from his previous chest x-ray from February 2020.  Increased pulmonary 

vascularity, new left-sided pleural effusion.  Ultrasound the chest showed a 5.1

cm right pleural effusion pocket and no appreciable fluid pocket on the left.  

EKG showed A. fib with a rate of 114 on presentation.  Room air pulse ox is 96%.

 Labs showed a white blood cell count 7.7, hemoglobin of 8.6, coagulation 

profile was within normal limits, sodium was 133, Shiela Lashway's were within 

normal limits, UN of 44, creatinine is 1.34, last normal lactic acid was within 

normal limits, troponins were 0.074, 0.075, and 0.072, proBNP was 5050, TSH was 

7.72, urinalysis was negative for any evidence of infection, COVID 19 PCR was 

negative.  He was given 500 mL an IV fluid bolus, blood pressure has improved, jarrett curz was also started on IV Lasix at 20 mg every 12 hours, today she denies any 

acute distress, is on room air with a pulse ox of 96%.








Review of Systems


All systems: negative


Constitutional: Reports anorexia, Reports fatigue, Reports lethargy, Reports 

poor appetite, Reports weakness, Denies chills, Denies fever


Eyes: denies blurred vision, denies pain


Ears, nose, mouth and throat: Denies headache, Denies sore throat


Cardiovascular: Denies chest pain, Denies shortness of breath


Respiratory: Reports dyspnea, Denies cough


Gastrointestinal: Denies abdominal pain, Denies diarrhea, Denies nausea, Denies 

vomiting


Musculoskeletal: Denies myalgias


Integumentary: Denies pruritus, Denies rash


Neurological: Denies numbness, Denies weakness


Psychiatric: Denies anxiety, Denies depression


Endocrine: Denies fatigue, Denies weight change





Past Medical History


Past Medical History: Cancer, GERD/Reflux, Hyperlipidemia, Hypertension, 

Osteoarthritis (OA), Pulmonary Embolus (PE), Thyroid Disorder


Additional Past Medical History / Comment(s): Laryngeal cancer RADIATION 

CHEMOTHERAPY completed.  Hypothyroidism.  BPH.  GERD.  Coronary artery disease. 

Anemia.  Gout.  Diverticulosis.


History of Any Multi-Drug Resistant Organisms: None Reported


Past Surgical History: Orthopedic Surgery, Tonsillectomy


Additional Past Surgical History / Comment(s): RIGHT HIP REPLACEMENT.  trach


Past Anesthesia/Blood Transfusion Reactions: No Reported Reaction


Additional Past Anesthesia/Blood Transfusion Reaction / Comment(s): HAD 2 UNITS 

DURING CHEMO AT [facility]


Past Psychological History: No Psychological Hx Reported


Smoking Status: Former smoker


Past Alcohol Use History: Daily


Past Drug Use History: None Reported





- Past Family History


  ** Father


Family Medical History: Cancer


Additional Family Medical History / Comment(s): KIDNEY CANCER





  ** Mother


Family Medical History: Myocardial Infarction (MI), Respiratory Disorder





Medications and Allergies


                                Home Medications











 Medication  Instructions  Recorded  Confirmed  Type


 


Multivitamin/Iron/Folic Acid 1 tab PO DAILY@1200 02/12/18 06/08/20 History





[Centrum Complete Multivit Tab]    


 


Omeprazole [PriLOSEC] 20 mg PO DAILY@1200 02/12/18 06/08/20 History


 


Tamsulosin HCl [Flomax] 0.4 mg PO HS 02/12/18 06/08/20 History


 


Ferrous Sulfate [Iron (65  mg PO DAILY 08/15/19 06/08/20 History





Elemental)]    


 


Furosemide [Lasix] 20 mg PO DAILY 08/20/19 06/08/20 History


 


Potassium Chloride [Klor-Con 10] 10 meq PO DAILY 08/20/19 06/08/20 History


 


Acetaminophen-Codeine 300-30mg 1 tab PO HS 06/08/20 06/08/20 History





[Tylenol w/codeine #3]    


 


Ibuprofen [Motrin Ib] 200 - 800 mg PO BID PRN 06/08/20 06/08/20 History


 


Levothyroxine Sodium [Synthroid] 25 mcg PO DAILY 06/08/20 06/08/20 History


 


Magnesium Citrate 250mg 250 mg PO DAILY 06/08/20 06/08/20 History


 


Mirtazapine [Remeron] 7.5 mg PO HS 06/08/20 06/08/20 History


 


Sennosides [Senokot] 17.2 mg PO HS 06/08/20 06/08/20 History


 


Stool Softener 250mg 250 mg PO HS 06/08/20 06/08/20 History








                                    Allergies











Allergy/AdvReac Type Severity Reaction Status Date / Time


 


No Known Allergies Allergy   Verified 06/08/20 16:55














Physical Exam


Vitals: 


                                   Vital Signs











  Temp Pulse Pulse Resp BP BP Pulse Ox


 


 06/09/20 08:00  98 F   92  22   132/70  96


 


 06/09/20 04:00  98.6 F   111 H  18   140/83  95


 


 06/09/20 00:00    120 H  18   101/69  94 L


 


 06/08/20 18:55  98.4 F   120 H  19   149/95  92 L


 


 06/08/20 17:47   110 H   20  126/83   97


 


 06/08/20 17:33  97.7 F   130 H  18   118/82  93 L


 


 06/08/20 17:11   110 H   20  126/83   97


 


 06/08/20 16:11     20   


 


 06/08/20 15:11     20   


 


 06/08/20 15:08  97.5 F L  62   18  111/68   92 L








                                Intake and Output











 06/08/20 06/09/20 06/09/20





 22:59 06:59 14:59


 


Output Total  100 225


 


Balance  -100 -225


 


Output:   


 


  Urine  100 225


 


Other:   


 


  # Voids  1 1


 


  Weight 64.093 kg 68.5 kg 











 GENERAL EXAM: Alert, very pleasant, 87-year-old white male, resting comfortably

 in bed, on room air pulse ox of 96%, comfortable in no apparent distress.


HEAD: Normocephalic/atraumatic.


EYES: Normal reaction of pupils, equal size.  Conjunctiva pink, sclera white.


NOSE: Clear with pink turbinates.


THROAT: No erythema or exudates.


NECK: No masses, no JVD, no thyroid enlargement, no adenopathy.


CHEST: No chest wall deformity.  Symmetrical expansion. 


LUNGS: Equal air entry with diminished breath sounds over right base, no 

rhonchi, no wheezing


CVS: Regular rate and rhythm, normal S1 and S2, no gallops, no murmurs, no rubs


ABDOMEN: Soft, nontender.  No hepatosplenomegaly, normal bowel sounds, no guard

ing or rigidity.


EXTREMITIES: No clubbing, no edema, no cyanosis, 2+ pulses and upper and lower 

extremities.


MUSCULOSKELETAL: Muscle strength and tone normal.


SPINE: No scoliosis or deformity


SKIN: No rashes


CENTRAL NERVOUS SYSTEM: Alert and oriented -3.  No focal deficits, tone is 

normal in all 4 extremities.


PSYCHIATRIC: Alert and oriented -3.  Appropriate affect.  Intact judgment and 

insight.











Results





- Laboratory Findings


CBC and BMP: 


                                 06/09/20 03:06





                                 06/09/20 03:06


PT/INR, D-dimer











PT  10.6 sec (9.0-12.0)   06/09/20  03:06    


 


INR  1.0  (<1.2)   06/09/20  03:06    








Abnormal lab findings: 


                                  Abnormal Labs











  06/08/20 06/08/20 06/08/20





  16:05 16:05 16:05


 


RBC  3.15 L  


 


Hgb  8.6 L D  


 


Hct  27.9 L  


 


MCHC  30.9 L  


 


Lymphocytes #  0.9 L  


 


Sodium   133 L 


 


BUN   44 H 


 


Creatinine   1.34 H 


 


Glucose   122 H 


 


Calcium   11.8 H 


 


Iron   


 


TIBC   


 


% Saturation   


 


Ferritin   


 


Troponin I    0.074 H*


 


Total Protein   


 


Albumin   3.2 L 


 


HDL Cholesterol   


 


TSH   














  06/08/20 06/08/20 06/09/20





  22:29 22:29 03:06


 


RBC   


 


Hgb   


 


Hct   


 


MCHC   


 


Lymphocytes #   


 


Sodium   


 


BUN   


 


Creatinine   


 


Glucose   


 


Calcium   


 


Iron   


 


TIBC   


 


% Saturation   


 


Ferritin   


 


Troponin I  0.075 H*   0.072 H*


 


Total Protein   


 


Albumin   


 


HDL Cholesterol   


 


TSH   7.720 H 














  06/09/20 06/09/20





  03:06 03:06


 


RBC   2.87 L


 


Hgb   8.0 L


 


Hct   25.7 L


 


MCHC   30.9 L


 


Lymphocytes #   0.9 L


 


Sodium  132 L 


 


BUN  41 H 


 


Creatinine  


 


Glucose  


 


Calcium  11.3 H 


 


Iron  27 L 


 


TIBC  205 L 


 


% Saturation  13.17 L 


 


Ferritin  433.8 H 


 


Troponin I  


 


Total Protein  6.0 L 


 


Albumin  2.9 L 


 


HDL Cholesterol  38 L 


 


TSH  














- Diagnostic Findings


Chest x-ray: report reviewed, image reviewed


Additional studies: 


 EKG, echocardiogram reviewed








Assessment and Plan


Plan: 


 Assessment:





#1.  Chronic and recurrent right-sided pleural effusion, status post 

thoracentesis 3, and Pleurx catheter placement and subsequent removal, with 

cytologies negative 2, and no growth on the pleural fluid cultures.  Previous 

bronchoscopy with BAL and biopsies were negative.  Chest x-ray on presentation 

shows stable right sided pleural effusion and small left pleural effusion.  

Ultrasound of the chest showed 5.1 cm pocket on the right and no sizable pocket 

on the left.  Patient is on room air, denies any distress





#2.  Paroxysmal atrial fibrillation, patient presented with A. fib mildly rapid 

ventricular rate of 112 BPM on presentation, currently back in sinus rhythm





#3.  Advanced COPD, with FEV1 0.9 L or 35% of predicted, stage III COPD





#4.  History of laryngeal cancer status post chemoradiation





#5.  History of coronary arteriosclerosis





#6.  Hypertension





#7.  Hyperlipidemia





#8.  Osteoarthritis





#9.  Diverticulosis





#10.  Benign prostatic hyperplasia





#11.  Chronic anemia, unspecified





#12.  Elevated troponin





#13.  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

 of 40-45%





Plan:





Right-sided pleural effusion is unchanged in size compared to last chest x-ray 

completed in February 2020, continue the diuretics, patient is on room air, 

asymptomatic, no complaints of chest pain, no plans for thoracentesis at this 

time.  Echocardiogram results have been reviewed.  Patient is back in sinus, 

hemodynamically patient is stable, vital signs are stable, no fever or chills, 

any medical treatment, will follow





I performed a history & physical examination of the patient and discussed their 

management with my nurse practitioner, Tanisha Morales.  I reviewed the nurse pr

actitioner's note and agree with the documented findings and plan of care.  Lung

 sounds are positive for diminished breath sounds on the right side.  The 

findings and the impression was discussed with the patient.  I attest to the 

documentation by the nurse practitioner. 





Time with Patient: Greater than 30

## 2020-06-09 NOTE — PN
PROGRESS NOTE



Mr. Grimaldo is an 87-year-old male with a history of recurrent right pleural effusion,

status post PleurX and multiple thoracentesis in the past, history of hypertension,

history of laryngeal cancer, who presented with progressive weakness and lack of

energy.  His appetite has been down.  He has lost about 10 pounds.  He was noted to be

in atrial fibrillation on presentation.  He is back in sinus mechanism.  He denies any

prior cardiac history or any chest pain.  He has dyspnea on exertion.  He feels weak

but he does not feel any palpitation.  He has no significant peripheral edema, no clear

PND, nor orthopnea.  He has coarse factors remarkable size negative for smoking.  He is

nondiabetic no documented hyperlipidemia.



MEDICATION:

Include iron, Lasix 20 mg daily, levothyroxine, magnesium, Remeron, Prilosec,

potassium, Flomax.



REVIEW OF SYSTEMS:

Persistent had dyspnea on exertion.  The history of pleural effusion has been followed

by Dr. Orr on a regular basis.  His effusion were nonmalignant, GI system, no recent

GI bleeding no peptic ulcer disease.  He has poor appetite and weight loss,  system,

no dysuria or hematuria.  nervous system:  No stroke or seizure.



PHYSICAL EXAMINATION:

He is an 87-year-old male, alert, oriented, no apparent distress.  Blood pressure

140/80 with a heart rate in the 90s.  He was in the 130s earlier.

HEAD:  Normocephalic.

EYES:  Sclerae nonicteric.

NECK:  Good upstroke, no bruit.

LUNGS: with decreased air exchange on the right base was dullness.

HEART:  Regular rate and rhythm, S1, S2.  No S3.  No rub with systolic murmur, no

diastolic murmur.

ABDOMEN:  Soft, nontender.  Positive bowel sounds no organomegaly.

EXTREMITIES:  No edema.



LAB DATA:

Revealed a hemoglobin of 8.0.  Patient had a prior history of anemia.  His troponins

0.074, 0.075 and 0.072.  NT proBNP is 5,050.  His cholesterol is 125 with an LDL of 73.

His TSH aces is 7.7 with a free T4 of 1.4.  His coronavirus PCR is negative.  His EKG

revealed an atrial fibrillation, rate of 114 with nonspecific ST-T wave changes.  On

the monitor at this time, he is in sinus mechanism.  His chest x-ray revealed worsening

right pleural effusion.



IMPRESSION:

1. Progressive symptoms of fatigue with worsening pleural effusion and anemia.

2. Atrial fibrillation appears to be paroxysmal.

3. Elevation of troponin does not reflect an acute coronary syndrome, could be

    worsened by the anemia and the atrial fibrillation.

4. Recurrent pleural effusion.

5. Hypothyroidism.



RECOMMENDATION:

From the cardiac standpoint, I will obtain echocardiogram with Doppler.  I will

increase the dose of his beta blocker.  Will hold on anticoagulation pending the day if

he undergoes further procedure.  Depending on his progress, further recommendation will

be made.



Thank you for this consult.  Will follow with you.





MMODL / IJN: [id number] / Job#: [id number]

## 2020-06-09 NOTE — P.PN
Subjective


Progress Note Date: 06/09/20


Principal diagnosis: 





Weakness





Patient was seen and examined.  No acute events overnight.  Patient reports 

continued weakness, slightly improved since yesterday.  Started working with 

physical therapy today.  He denies any chest pain, shortness of breath or 

palpitations.  No nausea or vomiting.  No fever or chills.





Objective





- Vital Signs


Vital signs: 


                                   Vital Signs











Temp  98 F   06/09/20 08:00


 


Pulse  98   06/09/20 12:00


 


Resp  22   06/09/20 12:00


 


BP  147/72   06/09/20 12:00


 


Pulse Ox  94 L  06/09/20 12:00








                                 Intake & Output











 06/08/20 06/09/20 06/09/20





 18:59 06:59 18:59


 


Output Total  100 225


 


Balance  -100 -225


 


Weight 64.093 kg 68.5 kg 63.412 kg


 


Output:   


 


  Urine  100 225


 


Other:   


 


  # Voids  1 1


 


  # Bowel Movements   1














- Exam





General: [non toxic], [no distress], [appears at stated age]


Derm: [warm], [dry]


Head: [atraumatic], [normocephalic], [symmetric]


Eyes: [EOMI], [no lid lag], [anicteric sclera]


Mouth: [no lip lesion], [mucus membranes moist]


Cardiovascular: [S1S2 irregular], [no murmur], [positive DP pulse bilateral], 


Lungs: [Decreased breath sounds bilateral], [no rhonchi, no rales] , [no 

accessory muscle use]


Abdominal: [soft], [ nontender to palpation], [no guarding], [no appreciable 

organomegaly]


Ext: [no gross muscle atrophy], [no edema], [no contractures]


Neuro:  [no focal neuro deficits]


Psych: [Alert], [oriented], [appropriate affect] 








- Labs


CBC & Chem 7: 


                                 06/09/20 03:06





                                 06/09/20 03:06


Labs: 


                  Abnormal Lab Results - Last 24 Hours (Table)











  06/08/20 06/08/20 06/08/20 Range/Units





  16:05 16:05 16:05 


 


RBC  3.15 L    (4.30-5.90)  m/uL


 


Hgb  8.6 L D    (13.0-17.5)  gm/dL


 


Hct  27.9 L    (39.0-53.0)  %


 


MCHC  30.9 L    (31.0-37.0)  g/dL


 


Lymphocytes #  0.9 L    (1.0-4.8)  k/uL


 


Sodium   133 L   (137-145)  mmol/L


 


BUN   44 H   (9-20)  mg/dL


 


Creatinine   1.34 H   (0.66-1.25)  mg/dL


 


Glucose   122 H   (74-99)  mg/dL


 


Calcium   11.8 H   (8.4-10.2)  mg/dL


 


Iron     ()  ug/dL


 


TIBC     (228-460)  ug/dL


 


% Saturation     (15.00-50.00)   


 


Ferritin     (22.0-322.0)  ng/mL


 


Troponin I    0.074 H*  (0.000-0.034)  ng/mL


 


Total Protein     (6.3-8.2)  g/dL


 


Albumin   3.2 L   (3.5-5.0)  g/dL


 


HDL Cholesterol     (40-60)  mg/dL


 


TSH     (0.465-4.680)  mIU/L














  06/08/20 06/08/20 06/09/20 Range/Units





  22:29 22:29 03:06 


 


RBC     (4.30-5.90)  m/uL


 


Hgb     (13.0-17.5)  gm/dL


 


Hct     (39.0-53.0)  %


 


MCHC     (31.0-37.0)  g/dL


 


Lymphocytes #     (1.0-4.8)  k/uL


 


Sodium     (137-145)  mmol/L


 


BUN     (9-20)  mg/dL


 


Creatinine     (0.66-1.25)  mg/dL


 


Glucose     (74-99)  mg/dL


 


Calcium     (8.4-10.2)  mg/dL


 


Iron     ()  ug/dL


 


TIBC     (228-460)  ug/dL


 


% Saturation     (15.00-50.00)   


 


Ferritin     (22.0-322.0)  ng/mL


 


Troponin I  0.075 H*   0.072 H*  (0.000-0.034)  ng/mL


 


Total Protein     (6.3-8.2)  g/dL


 


Albumin     (3.5-5.0)  g/dL


 


HDL Cholesterol     (40-60)  mg/dL


 


TSH   7.720 H   (0.465-4.680)  mIU/L














  06/09/20 06/09/20 Range/Units





  03:06 03:06 


 


RBC   2.87 L  (4.30-5.90)  m/uL


 


Hgb   8.0 L  (13.0-17.5)  gm/dL


 


Hct   25.7 L  (39.0-53.0)  %


 


MCHC   30.9 L  (31.0-37.0)  g/dL


 


Lymphocytes #   0.9 L  (1.0-4.8)  k/uL


 


Sodium  132 L   (137-145)  mmol/L


 


BUN  41 H   (9-20)  mg/dL


 


Creatinine    (0.66-1.25)  mg/dL


 


Glucose    (74-99)  mg/dL


 


Calcium  11.3 H   (8.4-10.2)  mg/dL


 


Iron  27 L   ()  ug/dL


 


TIBC  205 L   (228-460)  ug/dL


 


% Saturation  13.17 L   (15.00-50.00)   


 


Ferritin  433.8 H   (22.0-322.0)  ng/mL


 


Troponin I    (0.000-0.034)  ng/mL


 


Total Protein  6.0 L   (6.3-8.2)  g/dL


 


Albumin  2.9 L   (3.5-5.0)  g/dL


 


HDL Cholesterol  38 L   (40-60)  mg/dL


 


TSH    (0.465-4.680)  mIU/L














Assessment and Plan


Assessment: 





Generalized weakness


Atrial fibrillation with RVR


Elevated troponin


Anemia


Pleural effusion


Subclinical Hypothyroidism


History of laryngeal cancer


Poor appetite with low BMI of 20





Patient's generalized weakness is multifactorial (A. fib with RVR, worsening 

pleural effusion, worsening anemia).  Patient will be placed on fall precautions

and PT and OT will be consulted for medical further management.





As seen on EKG.  Rate of 114.  We will place the patient on telemetry 

monitoring.  Cardiology would be consulted for further management of this 

patient.  Echocardiogram shows EF 40-45% with hypokinetic wall motion and 

moderate concentric LVH.  He has been started on metoprolol by mouth by 

cardiology.  Plans for possible anticoagulation if no thoracentesis by 

pulmonology.





Patient has elevated troponin of 0.074, 0.075, 0.072.  This could be related to 

demand ischemia from atrial fibrillation.  ACS ruled out.





Hemoglobin 8.  His hemoglobin on 05/04/2020 was 10.3.  Stool for occult blood is

negative.  Iron studies show iron deficiency and anemia of chronic disease.  

Plans to repeat CBC tomorrow morning.  Plans to transfuse if hemoglobin less 

than 7.  Start IV iron today.





Chest x-ray showing worsening pleural effusion.  Previously evaluated and 

thoracentesis performed by pulmonology.  We'll continue Lasix 20 mg IV twice a 

day.  Patient will be given supplemental O2 per NC to maintain O2 saturation 

greater than 92%.  Pulmonology has evaluated the patient and there is no 

thoracentesis is planned at this time.





Patient's home dose of Synthroid will be restarted for hypothyroidism.  Patient 

will need repeat TSH and free T4 in 6 weeks.





Patient will need to follow-up with ENT Dr. Barnes in the outpatient 

setting.





Patient reports a poor appetite and subjective weight loss.  Dietitian will be 

consulted.





[Patient admitted for generalized weakness, continues to work with PT and OT.  

Rate controlling medications added by cardiology, monitor vitals.  Hemoglobin 

downtrending started on IV iron, repeat CBC tomorrow.  Plans for possible DC 

tomorrow home with home PT versus [facility] if patient continues to show improvement.]

## 2020-06-09 NOTE — ECHOF
Referral Reason:SOB



MEASUREMENTS

--------

HEIGHT: 177.8 cm

WEIGHT: 68.5 kg

BP: 140/83

RVIDd:   3.1 cm     (< 3.3)

IVSd:   1.5 cm     (0.6 - 1.1)

LVIDd:   4.6 cm     (3.9 - 5.3)

LVPWd:   1.4 cm     (0.6 - 1.1)

IVSs:   1.9 cm

LVIDs:   3.0 cm

LVPWs:   2.0 cm

LA Diam:   3.5 cm     (2.7 - 3.8)

Ao Diam:   3.5 cm     (2.0 - 3.7)

AV Cusp:   2.3 cm     (1.5 - 2.6)

MV EXCURSION:   15.944 mm     (> 18.000)

MV EF SLOPE:   81 mm/s     (70 - 150)

EPSS:   0.9 cm

MV E Rhett:   0.62 m/s

MV DecT:   311 ms

MV A Rhett:   0.79 m/s

MV E/A Ratio:   0.78 







FINDINGS

--------

This was a technically difficult study with suboptimal apical views.

The left ventricular size is normal.   There is moderate concentric left ventricular hypertrophy.   O
verall left ventricular systolic function is mild-moderately impaired with, an EF between 40 - 45 %. 
  Apical septum LV wall motion is hypokinetic.

The right ventricle is normal in size.

The left atrial size is normal.

The right atrium is normal in size.

5.0mg of Lumason was utilized for enhancement of images

Interatrial and interventricular septum intact.

There is mild aortic valve sclerosis.   Trace amount of aortic regurgitation.

The mitral valve is normal.

The tricuspid valve was not well visualized.

The pulmonic valve was not well visualized.

The aortic root size is normal.

Normal inferior vena cava with normal inspiratory collapse consistent with estimated right atrial pre
ssure of  5 mmHg.

There is no pericardial effusion.



CONCLUSIONS

--------

1. This was a technically difficult study with suboptimal apical views.

2. The left ventricular size is normal.

3. There is moderate concentric left ventricular hypertrophy.

4. Overall left ventricular systolic function is mild-moderately impaired with, an EF between 40 - 45
 %.

5. Apical septum LV wall motion is hypokinetic.

6. The right ventricle is normal in size.

7. The left atrial size is normal.

8. The right atrium is normal in size.

9. 5.0mg of Lumason was utilized for enhancement of images

10. Interatrial and interventricular septum intact.

11. There is mild aortic valve sclerosis.

12. Trace amount of aortic regurgitation.

13. The mitral valve is normal.

14. The tricuspid valve was not well visualized.

15. The pulmonic valve was not well visualized.

16. The aortic root size is normal.

17. Normal inferior vena cava with normal inspiratory collapse consistent with estimated right atrial
 pressure of  5 mmHg.

18. There is no pericardial effusion.





SONOGRAPHER: Merle Matias RDCS

## 2020-06-09 NOTE — US
EXAMINATION TYPE: US chest

 

DATE OF EXAM: 6/9/2020

 

COMPARISON: chest xray, and US.

 

CLINICAL HISTORY: eval for pleural effusion.

 

TECHNIQUE:  Targeted ultrasound of the posterior lower bilateral hemithoraces

 

EXAM MEASUREMENTS:

 

Right Pleural Effusion pocket size:  5.1 cm

**  Right skin surface to fluid distance:  3.4 cm

Left Pleural Effusion pocket size:  no appreciable fluid pocket 

 

 

 

Right side marked for possible thoracentesis outside the dept.Pocket is very loculated.

 

 

 

Pulmonologists are able to review the images in the patient?s EMR.  

 

 

 

 

 

IMPRESSIONS: 

1. Small right pleural effusion

## 2020-06-10 VITALS — RESPIRATION RATE: 16 BRPM | SYSTOLIC BLOOD PRESSURE: 106 MMHG | HEART RATE: 80 BPM | DIASTOLIC BLOOD PRESSURE: 65 MMHG

## 2020-06-10 VITALS — TEMPERATURE: 98.1 F

## 2020-06-10 LAB
ANION GAP SERPL CALC-SCNC: 5 MMOL/L
BASOPHILS # BLD AUTO: 0 K/UL (ref 0–0.2)
BASOPHILS NFR BLD AUTO: 0 %
BUN SERPL-SCNC: 39 MG/DL (ref 9–20)
CALCIUM SPEC-MCNC: 11.3 MG/DL (ref 8.4–10.2)
CHLORIDE SERPL-SCNC: 97 MMOL/L (ref 98–107)
CO2 SERPL-SCNC: 28 MMOL/L (ref 22–30)
EOSINOPHIL # BLD AUTO: 0.1 K/UL (ref 0–0.7)
EOSINOPHIL NFR BLD AUTO: 1 %
ERYTHROCYTE [DISTWIDTH] IN BLOOD BY AUTOMATED COUNT: 2.78 M/UL (ref 4.3–5.9)
ERYTHROCYTE [DISTWIDTH] IN BLOOD: 14.3 % (ref 11.5–15.5)
GLUCOSE SERPL-MCNC: 95 MG/DL (ref 74–99)
HCT VFR BLD AUTO: 25.1 % (ref 39–53)
HGB BLD-MCNC: 8.1 GM/DL (ref 13–17.5)
LYMPHOCYTES # SPEC AUTO: 1 K/UL (ref 1–4.8)
LYMPHOCYTES NFR SPEC AUTO: 15 %
MCH RBC QN AUTO: 29.3 PG (ref 25–35)
MCHC RBC AUTO-ENTMCNC: 32.4 G/DL (ref 31–37)
MCV RBC AUTO: 90.5 FL (ref 80–100)
MONOCYTES # BLD AUTO: 0.5 K/UL (ref 0–1)
MONOCYTES NFR BLD AUTO: 7 %
NEUTROPHILS # BLD AUTO: 5.1 K/UL (ref 1.3–7.7)
NEUTROPHILS NFR BLD AUTO: 75 %
PLATELET # BLD AUTO: 300 K/UL (ref 150–450)
POTASSIUM SERPL-SCNC: 3.7 MMOL/L (ref 3.5–5.1)
SODIUM SERPL-SCNC: 130 MMOL/L (ref 137–145)
WBC # BLD AUTO: 6.7 K/UL (ref 3.8–10.6)

## 2020-06-10 RX ADMIN — ACETAMINOPHEN AND CODEINE PHOSPHATE PRN EACH: 300; 30 TABLET ORAL at 10:25

## 2020-06-10 RX ADMIN — LEVOTHYROXINE SODIUM SCH MCG: 25 TABLET ORAL at 05:25

## 2020-06-10 RX ADMIN — FUROSEMIDE SCH MG: 10 INJECTION, SOLUTION INTRAMUSCULAR; INTRAVENOUS at 09:14

## 2020-06-10 RX ADMIN — PANTOPRAZOLE SODIUM SCH MG: 40 TABLET, DELAYED RELEASE ORAL at 09:14

## 2020-06-10 RX ADMIN — ASPIRIN 81 MG CHEWABLE TABLET SCH MG: 81 TABLET CHEWABLE at 09:14

## 2020-06-10 RX ADMIN — METOPROLOL TARTRATE SCH MG: 25 TABLET, FILM COATED ORAL at 09:14

## 2020-06-10 NOTE — CDI
Documentation Clarification Form



Date: 06/10/2020 11:18:03 AM

From: Ashely Vilchis RN, CCDS

Phone: [phone]

MRN: [id number]

Admit Date: 06/08/2020 05:19:00 PM

Patient Name: Gilberto Grimaldo

Visit Number: [id number]



ATTENTION: The Clinical Documentation Specialists (CDI) and [facility] Coding Staff 
appreciate your assistance in clarifying documentation. Please respond to the 
clarification below the line at the bottom and electronically sign. The CDI & 
[facility] Coding staff will review the response and follow-up if needed. Please note: 
Queries are made part of the Legal Health Record. If you have any questions, 
please contact the author of this message via ITS.



Dr. Viviana Mora



The patient has been note to have a low BMI with weakness and a poor appetite.  
Please provide clinical significance



History/Risk Factors: 

GERD, Laryngeal CA, Hypothyroid, Anemia, Diverticulosis



Clinical Indicators:  

6/8 H&P: "generalized weakness, anemia, elevated Creatinine, hx of laryngeal ca,
poor appetite with low BMI of 20

6/8-6/10 Labs: HGB: 8.6/8/8.1, NA+ 133/132/130, BUN 44/41/39, Creatinine 
1.34/1.25/1.2, Total Protein 6.5/6, Albumin 3.2/2.9

Current BMI: 19.6

Insufficient energy intake: Poor appetite per MD Documentation

Weight Loss: subjective per pt

Decreased hand  strength: generalized weakness



Treatment:

Dietary Consult: Completed 6/9 with supplementation recommendations

Supplements: Ensure Enlive TID and [place] instant breakfast TID

6/8 500 cc 0.9% NS IVF Bolus

Lab monitoring: AM Daily



In your professional opinion, can you please clarify if these findings signify 
one of the following conditions? 

   

Mild Protein-Calorie Malnutrition 

Moderate Protein-Calorie Malnutrition

Severe Protein-Calorie Malnutrition

Other condition, please specify ___________________

Unable to determine



(Last Revision: July 2019)

 
__________________________________moderate______________________________________

___

LEXD

## 2020-06-10 NOTE — P.PN
Subjective


Progress Note Date: 06/10/20


Principal diagnosis: 


Weakness, hypotension, chronic pleural effusion





 This is a 87-year-old patient of Dr. Jacob with past medical history of 

laryngeal cancer post chemoradiation, hypertension, hyperlipidemia, 

osteoarthritis, previous history of pulmonary embolism, coronary artery disease,

chronic anemia, former smoker, diverticulosis, that follows with Dr. Murillo 

and Dr. Orr in the pulmonary clinic, and had previous thoracentesis for right-

sided pleural effusion.  The sampling was negative for microbial growth and 

negative cytologically.  This was on 07/11/2019.  Patient then developed 

recurrent right-sided pleural effusion and had to have a repeat thoracentesis on

07/25/2019 and again cultures and cytology were negative however the fluid was 

clearly exudative with an elevated LDH and elevated protein.  He had another 

repeat thoracentesis in August 2019 by interventional radiology, and the fluid 

was not sent for analysis.  Bronchoscopy and multiple biopsies and brush of the 

right lower lobe was done by Dr. Martines on August 16, with bronchial lavage was

negative, and biopsies and brush cytologies were negative.  Patient was then 

referred to Dr. Hills for recurrent right-sided pleural effusion and Pleurx 

catheter was placed on 08/28/2019, and the pleural fluid drainage had 

significantly slowed down and was eventually removed.  Patient does have severe 

COPD and his baseline FEV1 is 0.9 L or 35% of predicted.  His last CT scan of 

the chest was done in July 2019 showing some pleural plaquing with the 

possibility of previous exposure to asbestos.  Patient did refuse a PET scan 

ordered a follow-up visit on 02/18/2020, because he did not feel it was nec

essary.  The plan of care was discussed with the patient in the office, and 

related to his multiple comorbidities, poor lung function, patient would likely 

be a poor candidate for any treatment and patient when his symptomatic relief 

from his shortness of breath.  On 06 awake 2020 patient presents to the 

emergency department with complaints of weakness, but has been worsening over 

last 3 months.  He has been having increasingly hard time walking secondary to 

his legs being extremely weak, and patient hypotensive with blood pressure in 

the 80s.  Denies any fever, denied any chills or chest pains, no injuries, no 

nausea vomiting or diarrhea.  As been experiencing worsening shortness of breath

decreased appetite and generalized weakness.  His been increasingly sleeping 

more through the day.  His chest x-ray showed moderately sized right-sided 

pleural effusion and consolidation of the right lower lobe which is unchanged 

from his previous chest x-ray from February 2020.  Increased pulmonary 

vascularity, new left-sided pleural effusion.  Ultrasound the chest showed a 5.1

cm right pleural effusion pocket and no appreciable fluid pocket on the left.  

EKG showed A. fib with a rate of 114 on presentation.  Room air pulse ox is 96%.

 Labs showed a white blood cell count 7.7, hemoglobin of 8.6, coagulation 

profile was within normal limits, sodium was 133, Shiela Lashway's were within 

normal limits, UN of 44, creatinine is 1.34, last normal lactic acid was within 

normal limits, troponins were 0.074, 0.075, and 0.072, proBNP was 5050, TSH was 

7.72, urinalysis was negative for any evidence of infection, COVID 19 PCR was 

negative.  He was given 500 mL an IV fluid bolus, blood pressure has improved, 

he was also started on IV Lasix at 20 mg every 12 hours, today she denies any 

acute distress, is on room air with a pulse ox of 96%.





On 06/10/2020 patient seen in follow-up on a general medical floor.  He is awake

and alert, he sitting up in the chair, in no acute distress.  Room air pulse ox 

is 94-95%, no complaints of chest pain, no cough or congestion, no fever or 

chills.  Vital signs are stable.  Today's labs have been reviewed, white blood 

cell is 6.7, hemoglobin is 8.1 patient does have chronic anemia, and he is 

getting iron transfusions, sodium is 1:30, potassium 3.7, chloride is 97, CO2 is

28, BUN is 39, creatinine is 1.2.  Troponins topped out at 0.075 without 

significant rise or fall.  he has denied any chest pain, urinalysis was 

negative, coronavirus  PCR was negative








Objective





- Vital Signs


Vital signs: 


                                   Vital Signs











Temp  98.1 F   06/10/20 09:15


 


Pulse  80   06/10/20 11:20


 


Resp  16   06/10/20 11:20


 


BP  106/65   06/10/20 11:20


 


Pulse Ox  94 L  06/10/20 11:20








                                 Intake & Output











 06/09/20 06/10/20 06/10/20





 18:59 06:59 18:59


 


Intake Total 420  118


 


Output Total 225 450 150


 


Balance 195 -450 -32


 


Weight 63.412 kg 62.7 kg 


 


Intake:   


 


  Oral 420  118


 


Output:   


 


  Urine 225 450 150


 


Other:   


 


  # Voids 1 1 


 


  # Bowel Movements 1  














- Exam


 GENERAL EXAM: Alert, very pleasant, 87-year-old white male on room air, with a 

pulse ox of 95%, comfortable in no apparent distress.


HEAD: Normocephalic/atraumatic.


EYES: Normal reaction of pupils, equal size.  Conjunctiva pink, sclera white.


NOSE: Clear with pink turbinates.


THROAT: No erythema or exudates.


NECK: No masses, no JVD, no thyroid enlargement, no adenopathy.


CHEST: No chest wall deformity.  Symmetrical expansion. 


LUNGS: Equal air entry with no crackles, wheeze, rhonchi or dullness.


CVS: Regular rate and rhythm, normal S1 and S2, no gallops, no murmurs, no rubs


ABDOMEN: Soft, nontender.  No hepatosplenomegaly, normal bowel sounds, no 

guarding or rigidity.


EXTREMITIES: No clubbing, no edema, no cyanosis, 2+ pulses and upper and lower 

extremities.


MUSCULOSKELETAL: Muscle strength and tone normal.


SPINE: No scoliosis or deformity


SKIN: No rashes


CENTRAL NERVOUS SYSTEM: Alert and oriented -3.  No focal deficits, tone is 

normal in all 4 extremities.


PSYCHIATRIC: Alert and oriented -3.  Appropriate affect.  Intact judgment and 

insight.











- Labs


CBC & Chem 7: 


                                 06/10/20 06:32





                                 06/10/20 06:32


Labs: 


                  Abnormal Lab Results - Last 24 Hours (Table)











  06/09/20 06/10/20 06/10/20 Range/Units





  03:06 06:32 06:32 


 


RBC    2.78 L  (4.30-5.90)  m/uL


 


Hgb    8.1 L  (13.0-17.5)  gm/dL


 


Hct    25.1 L  (39.0-53.0)  %


 


Sodium   130 L   (137-145)  mmol/L


 


Chloride   97 L   ()  mmol/L


 


BUN   39 H   (9-20)  mg/dL


 


Calcium   11.3 H   (8.4-10.2)  mg/dL


 


Iron  27 L    ()  ug/dL


 


TIBC  205 L    (228-460)  ug/dL


 


% Saturation  13.17 L    (15.00-50.00)   


 


Ferritin  433.8 H    (22.0-322.0)  ng/mL














Assessment and Plan


Plan: 


 Assessment:





#1.  Chronic and recurrent right-sided pleural effusion, status post 

thoracentesis 3, and Pleurx catheter placement and subsequent removal, with 

cytologies negative 2, and no growth on the pleural fluid cultures.  Previous 

bronchoscopy with BAL and biopsies were negative.  Chest x-ray on presentation 

shows stable right sided pleural effusion and small left pleural effusion.  

Ultrasound of the chest showed 5.1 cm pocket on the right and no sizable pocket 

on the left.  Patient is on room air, denies any distress





#2.  Paroxysmal atrial fibrillation, patient presented with A. fib mildly rapid 

ventricular rate of 112 BPM on presentation, currently back in sinus rhythm





#3.  Advanced COPD, with FEV1 0.9 L or 35% of predicted, stage III COPD





#4.  History of laryngeal cancer status post chemoradiation





#5.  History of coronary arteriosclerosis





#6.  Hypertension





#7.  Hyperlipidemia





#8.  Osteoarthritis





#9.  Diverticulosis





#10.  Benign prostatic hyperplasia





#11.  Chronic anemia, unspecified





#12.  Elevated troponin





#13.  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

of 40-45%





Plan:





Continue medical treatment, no plans for thoracentesis, patient is asymptomatic,

right-sided pleural effusion is chronic in nature, and not significantly changed

since February 2020.  Patient is on room air, from pulmonary perspective patient

can be considered for discharge home if cleared by cardiology, and he can follow

up with Dr. Orr in the office in one or 2 weeks





I performed a history & physical examination of the patient and discussed their 

management with my nurse practitioner, Tanisha Morales.  I reviewed the nurse 

practitioner's note and agree with the documented findings and plan of care.  

Lung sounds are positive for diminished breath sounds on the right side.  The 

findings and the impression was discussed with the patient.  I attest to the 

documentation by the nurse practitioner. 





Time with Patient: Less than 30

## 2020-06-10 NOTE — P.DS
Providers


Date of admission: 


06/08/20 17:19





Expected date of discharge: 06/10/20


Attending physician: 


Viviana Mora MD





Consults: 





                                        





06/08/20 17:19


Consult Physician Stat 


   Consulting Provider: Lakshmi Roberto


   Consult Reason/Comments: pleural effusion, weakness


   Do you want consulting provider notified?: Yes


Consult Physician Urgent 


   Consulting Provider: Casey Hwang


   Consult Reason/Comments: Weakness, A. fib with RVR, elevated troponin


   Do you want consulting provider notified?: Yes





06/09/20 10:37


Consult Physician Routine 


   Consulting Provider: Trae Orr


   Consult Reason/Comments: Right effusion


   Do you want consulting provider notified?: Yes











Primary care physician: 


[facility] Course: 





87-year-old male with PMH of laryngeal cancer, hypertension, hypothyroidism, BPH

presents to the ED for generalized weakness.  Patient reports typically being 

able to ambulate with a walker.  Today, he was unable to stand up.  His 

generalized weakness has been getting worse over the past 3 days.  Patient 

denies any focal neurologic deficits.  Patient also reports a decreased appetite

and subjective 30 pound weight loss over the past 3 months.  He does have a 

history of laryngeal cancer and follows Dr. Barnes.  He has a history of 

smoking 40 years ago.  Patient also reports some swelling in his bilateral 

ankles.  Patient reports constipation, last bowel movement 4 days ago.  He 

denies any headache, nausea or vomiting, fever or chills, cough, chest pain, 

shortness of breath, palpitations, changes in urination.  He denies any 

dizziness, numbness/weakness/tingling of the extremities.  In the ED, vital 

signs were stable except for O2 saturation of 92% on room air.  EKG showed 

atrial fibrillation with RVR ventricular rate of 114.  Chest x-ray showed 

pleural fluid and thickening right lower lobe, slightly worsened.  CBC showed 

hemoglobin of 8.6.  INR was within normal limits.  CMP showed sodium of 133, B1 

of 44, creatinine 1.34, calcium 11.8.  Troponin was 0.074.  BNP was 5050.  Stool

for occult blood was negative.  Patient is admitted for generalized weakness, A.

fib with RVR, anemia and pleural effusion with pulmonology and cardiology on 

consultation.


Patient's generalized weakness was thought to be multifactorial related to A. 

fib, worsening pleural effusions and worsening anemia.  PT and OT was consulted 

and recommended SCR versus home with home PT which patient was agreeable 4.  

With regard to his A. fib with RVR cardiology was consulted.  Cardiology 

recommended continuing his home medications and echocardiogram.  Echocardiogram 

showed EF 40-45% with hypokinetic wall motion and moderate concentric LVH.  

Cardiology started the patient on metoprolol by mouth.  He also had elevated 

troponins of 0.074, 0.075 0.072.  Acute coronary syndrome was ruled out.  

Patient was noted to have a decreased hemoglobin of 8, hemoglobin on May 4 was 

10.3.  Stool for occult blood was negative.  Iron studies showed iron deficiency

and anemia of chronic disease.  He was given IV iron and his hemoglobin was 

trended.  His hemoglobin remains stable and no blood transfusion was required.  

Pulmonology evaluated the patient with regard to his pleural effusion and 

decision was made not to do thoracentesis.  He was continued on Lasix 20 g IV 

twice a day and transitioned to oral Lasix on discharge.  Patient was noted to 

have subclinical hypothyroidism.  He was continued on his home dose of Synthroid

and advised to repeat TSH and free T4 in 6 weeks.  Patient was advised to 

follow-up with ENT for history of laryngeal cancer.





Patient was seen and examined.  No acute events overnight.  Patient reports 

improvement in his generalized weakness.  He denies any chest pain, shortness of

breath or palpitations.  No nausea or vomiting.  No fever or chills.  Wanting to

go home.





General: [non toxic], [no distress], [appears at stated age]


Derm: [warm], [dry]


Head: [atraumatic], [normocephalic], [symmetric]


Eyes: [EOMI], [no lid lag], [anicteric sclera]


Mouth: [no lip lesion], [mucus membranes moist]


Cardiovascular: [S1S2 irregular], [no murmur], [positive DP pulse bilateral], 


Lungs: [Decreased breath sounds bilateral], [no rhonchi, no rales] , [no 

accessory muscle use]


Abdominal: [soft], [ nontender to palpation], [no guarding], [no appreciable 

organomegaly]


Ext: [no gross muscle atrophy], [no edema], [no contractures]


Neuro:  [no focal neuro deficits]


Psych: [Alert], [oriented], [appropriate affect] 





Generalized weakness


Atrial fibrillation with RVR


Elevated troponin


Anemia


Pleural effusion


Subclinical Hypothyroidism


History of laryngeal cancer


Poor appetite with low BMI of 20





Patient's generalized weakness is multifactorial (A. fib with RVR, worsening 

pleural effusion, worsening anemia).  Patient will be placed on fall precautions

and PT and OT will be consulted for medical further management.





As seen on EKG.  Rate of 114.  We will place the patient on telemetry 

monitoring.  Cardiology would be consulted for further management of this 

patient.  Echocardiogram shows EF 40-45% with hypokinetic wall motion and 

moderate concentric LVH.  He has been started on metoprolol by mouth by 

cardiology.  I have discussed anticoagulation with the patient and he would 

prefer to hold any anticoagulation at this time and discuss risk versus reward 

with his PCP.  We will continue aspirin in the meantime.





Patient has elevated troponin of 0.074, 0.075, 0.072.  This could be related to 

demand ischemia from atrial fibrillation.  ACS ruled out.





Hemoglobin 8.  His hemoglobin on 05/04/2020 was 10.3.  Stool for occult blood is

negative.  Iron studies show iron deficiency and anemia of chronic disease.  

Repeat hemoglobin this morning 8.1.  Plans to transfuse if hemoglobin less than 

7.  DC IV iron and continue ferrous sulfate at home.





Chest x-ray showing worsening pleural effusion.  Previously evaluated and 

thoracentesis performed by pulmonology.  Transition IV Lasix to oral for 

discharge.  Patient will be given supplemental O2 per NC to maintain O2 

saturation greater than 92%.  Pulmonology has evaluated the patient and there is

no thoracentesis is planned at this time.





Patient's home dose of Synthroid will be restarted for hypothyroidism.  Patient 

will need repeat TSH and free T4 in 6 weeks.





Patient will need to follow-up with ENT Dr. Barnes in the outpatient 

setting.





Patient reports a poor appetite and subjective weight loss.  Dietitian will be 

consulted.





[Patient to be discharged home with home PT.  Follow-up PCP within 3 days of 

discharge.  Needs repeat TSH and free T4 in 6 weeks.  Advised to follow-up with 

pulmonology and cardiology in 1-2 weeks.  Patient verbalized understanding of 

the plan.  Anticipate DC home today.  This complex discharge took about 45 

minutes to complete.]





Pertinent Studies: 





Chest ultrasound, echocardiogram, chest x-ray


Patient Condition at Discharge: Good





Plan - Discharge Summary


New Discharge Prescriptions: 


New


   Aspirin 81 mg PO DAILY #30 chew


   Metoprolol Tartrate [Lopressor] 25 mg PO BID #60 tab





Continue


   Multivitamin/Iron/Folic Acid [Centrum Complete Multivit Tab] 1 tab PO 

DAILY@1200


   Tamsulosin HCl [Flomax] 0.4 mg PO HS


   Omeprazole [PriLOSEC] 20 mg PO DAILY@1200


   Ferrous Sulfate [Iron (65 MG Elemental)] 325 mg PO DAILY


   Furosemide [Lasix] 20 mg PO DAILY


   Acetaminophen-Codeine 300-30mg [Tylenol w/codeine #3] 1 tab PO HS


   Sennosides [Senokot] 17.2 mg PO HS


   Stool Softener 250mg 250 mg PO HS


   Magnesium Citrate 250mg 250 mg PO DAILY


   Mirtazapine [Remeron] 7.5 mg PO HS


   Levothyroxine Sodium [Synthroid] 25 mcg PO DAILY





Discontinued


   Potassium Chloride [Klor-Con 10] 10 meq PO DAILY


   Ibuprofen [Motrin Ib] 200 - 800 mg PO BID PRN


     PRN Reason: Pain


Discharge Medication List





Multivitamin/Iron/Folic Acid [Centrum Complete Multivit Tab] 1 tab PO DAILY@1200

02/12/18 [History]


Omeprazole [PriLOSEC] 20 mg PO DAILY@1200 02/12/18 [History]


Tamsulosin HCl [Flomax] 0.4 mg PO HS 02/12/18 [History]


Ferrous Sulfate [Iron (65 MG Elemental)] 325 mg PO DAILY 08/15/19 [History]


Furosemide [Lasix] 20 mg PO DAILY 08/20/19 [History]


Acetaminophen-Codeine 300-30mg [Tylenol w/codeine #3] 1 tab PO HS 06/08/20 

[History]


Levothyroxine Sodium [Synthroid] 25 mcg PO DAILY 06/08/20 [History]


Magnesium Citrate 250mg 250 mg PO DAILY 06/08/20 [History]


Mirtazapine [Remeron] 7.5 mg PO HS 06/08/20 [History]


Sennosides [Senokot] 17.2 mg PO HS 06/08/20 [History]


Stool Softener 250mg 250 mg PO HS 06/08/20 [History]


Aspirin 81 mg PO DAILY #30 chew 06/10/20 [Rx]


Metoprolol Tartrate [Lopressor] 25 mg PO BID #60 tab 06/10/20 [Rx]








Follow up Appointment(s)/Referral(s): 


Fermin Jacob MD [Primary Care Provider] - 1-2 days (PLEASE CALL TOMORROW to 

schedule follow up. OFFICE IS CLOSED WEDNESDAYS. Dr Jacob should know that you 

were hospitalized for ATRIAL FIBRILLATION. )


Khoi Mendiola MD [STAFF PHYSICIAN] - 06/18/20 4:00 pm (Please bring your 

license, insurance card, and any medications you take.)


Trae Orr DO [Doctor of Osteopathic Medicine] - 06/23/20 9:30 am


[facility] Visiting Nurse, [NON-STAFF] - 


Patient Instructions/Handouts:  A-fib (Atrial Fibrillation) (DC)


Activity/Diet/Wound Care/Special Instructions: 


Diet: Low salt 


FU with PCP 3 days of discharge.  Follow-up with cardiology within 1 week of 

discharge.  Follow up with pulmonology within 1 week of discharge.


Take all medications as advised.





Discharge Disposition: HOME SELF-CARE

## 2020-06-10 NOTE — CDI
Documentation Clarification Form



Date: 06/10/2020 11:30:50 AM

From: Ashely Vilchis RN, CCDS

Phone: [phone]

MRN: [id number]

Admit Date: 06/08/2020 05:19:00 PM

Patient Name: Gilberto Grimaldo

Visit Number: [id number]



ATTENTION: The Clinical Documentation Specialists (CDI) and [facility] Coding Staff 
appreciate your assistance in clarifying documentation. Please respond to the 
clarification below the line at the bottom and electronically sign. The CDI & 
[facility] Coding staff will review the response and follow-up if needed. Please note: 
Queries are made part of the Legal Health Record. If you have any questions, 
please contact the author of this message via ITS.



Dr. Viviana Mora



CKD is documented in the H&P and requires further specificity 



History/Risk Factors: 

HTN, BPH, Cad, Anemia

5/4/2020 Patients Historical BUN/CR/GFR: 37/1.4/44.9   



Clinical Indicators: 

6/8 H&P: "This could be a component of chronic kidney disease."

6/8 ED Note: "Lactic acid normal, kidney function is at baseline."

Current BUN: 44/41/39

CR: 1.34/1.25/1.2

GFR: 48/52/54



Treatment:

6/9 500 cc 0.9% NS IVF Bolus no continuous IVF following



In order to capture the severity of condition, please clarify the stage of the 
CKD, if known:



CKD Stage 1 (GFR > 90)

CKD Stage 2 (GFR 60-89)

CKD Stage 3 (GFR 30-59)

CKD Stage 4 (GFR 15-29)

CKD Stage 5 (GFR <15)

ESRD

Other, please specificity ___________

Unable to determine



(Last Revision: February 2020)

____________________________likely TULIO and not 
CKD_______________________________________________

MTDD Discharged

## 2020-06-10 NOTE — P.PN
Subjective


Progress Note Date: 06/10/20


This is a pleasant 87-year-old gentleman with history of recurrent right pleural

effusion, status post PleurX and multiple thoracentesis in the past, 

hypertension, laryngeal cancer who presented with progressive weakness and lack 

of energy.  His appetite has been down he's lost about 10 pounds.  On present

ation he was noted to be in atrial fibrillation.  He is currently maintaining 

sinus mechanism.  Denies any cardiac history or any chest discomfort.  He does 

have dyspnea on exertion which is chronic.  He feels weak but does not complain 

of any palpitations.  He has no significant peripheral edema, no PND or 

orthopnea.  Patient's family be anemic on admission with a hemoglobin today of 

8.1, hemoglobin was 10.3 on May 4 of this year but has been low in the past. 

ultrasound of the chest was done and patient has been evaluated by pulmonary 

with no plans for thoracentesis.  Does have right-sided pleural effusion which 

is chronic in nature and not significantly changed since February 2020.  Echoca

rdiogram yesterday showed mild to moderately impaired LV systolic function with 

ejection fraction between 40-45%, apical septal wall hypokinesis, moderate LVH, 

mild aortic valve sclerosis and trace AR.








Objective





- Vital Signs


Vital signs: 


                                   Vital Signs











Temp  98.1 F   06/10/20 09:15


 


Pulse  80   06/10/20 11:20


 


Resp  16   06/10/20 11:20


 


BP  106/65   06/10/20 11:20


 


Pulse Ox  94 L  06/10/20 11:20








                                 Intake & Output











 06/09/20 06/10/20 06/10/20





 18:59 06:59 18:59


 


Intake Total 420  118


 


Output Total 225 450 150


 


Balance 195 -450 -32


 


Weight 63.412 kg 62.7 kg 


 


Intake:   


 


  Oral 420  118


 


Output:   


 


  Urine 225 450 150


 


Other:   


 


  # Voids 1 1 


 


  # Bowel Movements 1  














- Exam


PHYSICAL EXAMINATION: 





HEENT: Head is atraumatic, normocephalic.  Pupils equal, round.  Neck is supple.

 There is no elevated jugular venous pressure.





HEART EXAMINATION: Heart sounds regular, S1 and S2 normal.  No murmur or gallop 

heard.





CHEST EXAMINATION: Lungs reveal diminished air entry and faint crackles right 

base. No chest wall tenderness is noted on palpation or with deep breathing.





ABDOMEN:  Soft, nontender. Bowel sounds are heard. No organomegaly noted.


 


EXTREMITIES: 2+ peripheral pulses with no evidence of peripheral edema and no 

calf tenderness noted.





NEUROLOGIC patient is awake, alert and oriented x3.


 


.


 











- Labs


CBC & Chem 7: 


                                 06/10/20 06:32





                                 06/10/20 06:32


Labs: 


                  Abnormal Lab Results - Last 24 Hours (Table)











  06/10/20 06/10/20 Range/Units





  06:32 06:32 


 


RBC   2.78 L  (4.30-5.90)  m/uL


 


Hgb   8.1 L  (13.0-17.5)  gm/dL


 


Hct   25.1 L  (39.0-53.0)  %


 


Sodium  130 L   (137-145)  mmol/L


 


Chloride  97 L   ()  mmol/L


 


BUN  39 H   (9-20)  mg/dL


 


Calcium  11.3 H   (8.4-10.2)  mg/dL














Assessment and Plan


Assessment: 


#1 paroxysmal atrial fibrillation


#2 anemia


#3 elevation of troponin does not reflect any acute coronary syndrome, could be 

due to anemia and atrial fibrillation


#4 chronic pleural effusion


#5 hypothyroidism


#6 cardiomyopathy








Plan: 


From cardiology's perspective, medications were reviewed and will continue the 

same.  We would favor patient be anticoagulated however this will need to be 

decided by primary due to anemia.  No further cardiac workup at this time.  We 

will follow the patient on an as-needed basis.  Please do not hesitate to 

contact us with questions.





NP note has been reviewed, I agree with a documented findings and plan of care. 

Patient was seen and examined.